# Patient Record
Sex: FEMALE | Race: BLACK OR AFRICAN AMERICAN | NOT HISPANIC OR LATINO | Employment: FULL TIME
[De-identification: names, ages, dates, MRNs, and addresses within clinical notes are randomized per-mention and may not be internally consistent; named-entity substitution may affect disease eponyms.]

---

## 2017-07-29 ENCOUNTER — HEALTH MAINTENANCE LETTER (OUTPATIENT)
Age: 47
End: 2017-07-29

## 2018-05-18 ENCOUNTER — HOSPITAL ENCOUNTER (EMERGENCY)
Facility: CLINIC | Age: 48
Discharge: HOME | End: 2018-05-18
Attending: EMERGENCY MEDICINE | Admitting: EMERGENCY MEDICINE
Payer: COMMERCIAL

## 2018-05-18 ENCOUNTER — APPOINTMENT (OUTPATIENT)
Dept: GENERAL RADIOLOGY | Facility: CLINIC | Age: 48
End: 2018-05-18
Attending: EMERGENCY MEDICINE
Payer: COMMERCIAL

## 2018-05-18 VITALS
WEIGHT: 180 LBS | DIASTOLIC BLOOD PRESSURE: 68 MMHG | SYSTOLIC BLOOD PRESSURE: 122 MMHG | OXYGEN SATURATION: 99 % | BODY MASS INDEX: 31.89 KG/M2 | RESPIRATION RATE: 20 BRPM | HEART RATE: 72 BPM | TEMPERATURE: 98.6 F

## 2018-05-18 DIAGNOSIS — M54.41 ACUTE RIGHT-SIDED LOW BACK PAIN WITH RIGHT-SIDED SCIATICA: ICD-10-CM

## 2018-05-18 PROCEDURE — 25000132 ZZH RX MED GY IP 250 OP 250 PS 637: Performed by: EMERGENCY MEDICINE

## 2018-05-18 PROCEDURE — 72100 X-RAY EXAM L-S SPINE 2/3 VWS: CPT

## 2018-05-18 PROCEDURE — 99283 EMERGENCY DEPT VISIT LOW MDM: CPT

## 2018-05-18 RX ORDER — HYDROCODONE BITARTRATE AND ACETAMINOPHEN 5; 325 MG/1; MG/1
1 TABLET ORAL ONCE
Status: DISCONTINUED | OUTPATIENT
Start: 2018-05-18 | End: 2018-05-18

## 2018-05-18 RX ORDER — TRAMADOL HYDROCHLORIDE 50 MG/1
50 TABLET ORAL ONCE
Status: COMPLETED | OUTPATIENT
Start: 2018-05-18 | End: 2018-05-18

## 2018-05-18 RX ORDER — METHYLPREDNISOLONE 4 MG
TABLET, DOSE PACK ORAL
Qty: 21 TABLET | Refills: 0 | Status: SHIPPED | OUTPATIENT
Start: 2018-05-18 | End: 2021-01-28

## 2018-05-18 RX ORDER — HYDROCODONE BITARTRATE AND ACETAMINOPHEN 5; 325 MG/1; MG/1
1 TABLET ORAL EVERY 6 HOURS PRN
Qty: 10 TABLET | Refills: 0 | Status: SHIPPED | OUTPATIENT
Start: 2018-05-18 | End: 2021-01-28

## 2018-05-18 RX ADMIN — TRAMADOL HYDROCHLORIDE 50 MG: 50 TABLET, COATED ORAL at 05:04

## 2018-05-18 ASSESSMENT — ENCOUNTER SYMPTOMS
MYALGIAS: 1
NUMBNESS: 0
BACK PAIN: 1

## 2018-05-18 NOTE — ED PROVIDER NOTES
History     Chief Complaint:  Back Pain    HPI   Hortencia Cooper is a 48 year old female who presents to the emergency department today for evaluation of back pain. The patient reports four days ago, she was bending down to  her phone out of her purse when she started to experience right sided lower back pain radiating down her right leg and intermittent tingling in her right leg specifically when ambulating. She went into urgent care two days ago and was started on Flexeril and Naproxen. She notes that these two medications have not helped with her pain. She last took her flexeril this morning at 0300. Her pain has worsened prompting her visit to the emergency department. At arrival, the patient is standing and states that the pain is much worse with sitting or laying down. She denies numbness, incontinence, and loss of bowel.     Allergies:  Oxycodone    Medications:    Flexeril  Naproxen    Past Medical History:    Pyelonephritis  Lumbago  Other acne    Past Surgical History:       D and C    Family History:    Diabetes    Social History:  The patient was accompanied to the ED by .  Smoking Status: Never  Smokeless Tobacco: Never  Alcohol Use: No  Marital Status:      Review of Systems   Genitourinary:        Negative incontinence   Musculoskeletal: Positive for back pain and myalgias.   Neurological: Negative for numbness.   All other systems reviewed and are negative.    Physical Exam     Patient Vitals for the past 24 hrs:   BP Temp Temp src Pulse Resp SpO2 Weight   18 0443 124/73 98.6  F (37  C) Temporal 88 18 100 % 81.6 kg (180 lb)         Physical Exam  General: Patient is alert and interactive when I enter the room  Head:  The scalp, face, and head appear normal  Eyes:  Conjunctivae are normal  ENT:    The nose is normal    Pinnae are normal    External acoustic canals are normal  Neck:  Trachea midline  CV:  Pulses are normal   Resp:  No respiratory distress    Abdomen:      Soft, non-tender, non-distended  Musc:  Normal muscular tone    No major joint effusions    Midline lower lumbar tenderness and right paraspinal tenderness  Skin:  No rash or lesions noted  Neuro:  Speech is normal and fluent. Face is symmetric.     Moving all extremities well. Strength intact of LE, 5/5 HF/KF/KE/DP/PF, sensation intact, gait intact   Psych: Awake. Alert.  Normal affect.  Appropriate interactions.    Emergency Department Course   Imaging:  Radiology findings were communicated with the patient and family who voiced understanding of the findings.  Lumbar spine XR, 2-3 views  IMPRESSION: No acute fractures. Alignment within normal limits.  Report per radiology     Interventions:  0504 Tramadol 50 mg PO    Emergency Department Course:  Nursing notes and vitals reviewed.  The patient was sent for a Lumbar spine XR, 2-3 views while in the emergency department, results above.   0445: I performed an exam of the patient as documented above.   0455: Patient rechecked and updated.   0628: Patient rechecked and updated.   Findings and plan explained to the Patient and spouse. Patient discharged home with instructions regarding supportive care, medications, and reasons to return. The importance of close follow-up was reviewed. The patient was prescribed Norco and Medrol Dosepak.  I personally reviewed the imaging results with the Patient and spouse and answered all related questions prior to discharge.    Impression & Plan    Medical Decision Making:  Hortencia Cooper is a 48 year old female who presents for evaluation of back pain that started after bending over to  her phone. Pain has mildly improved with interventions in the emergency department. Lumbar x-ray was obtained and was negative for acute fractures.  No red flag symptoms to suggest CT and/or MRI is indicated at this point.  There is no clinical evidence of cauda equina syndrome, discitis, spinal/epidural space hematoma  or epidural abscess or other worrisome etiology. The neurological exam is normal and the patient's symptoms seem consistent with a herniated disc. The patient will be discharged with pain medications to use as directed. Ice or heat to the back and stretching exercises. No heavy lifting, bending or twisting. Return if increasing pain, numbness, weakness, or bowel or bladder dysfunction. Of note, we discussed her oxycodone allergy which was a rash of her LE after she she had been taking them for a few weeks. I think it would be reasonable to try vicodin but we discussed allergy precautions. She was advised to schedule follow-up with her primary doctor within 3 days to re-assess symptoms.    Diagnosis:    ICD-10-CM    1. Acute right-sided low back pain with right-sided sciatica M54.41        Disposition:  discharged to home    Discharge Medications:  New Prescriptions    HYDROCODONE-ACETAMINOPHEN (NORCO) 5-325 MG PER TABLET    Take 1 tablet by mouth every 6 hours as needed for severe pain    METHYLPREDNISOLONE (MEDROL DOSEPAK) 4 MG TABLET    Follow package instructions       Scribe Disclosure:  Justo LUCERO, am serving as a scribe at 4:45 AM on 5/18/2018 to document services personally performed by Christine Marx MD based on my observations and the provider's statements to me.     5/18/2018   [facility] EMERGENCY DEPARTMENT       Christine Marx MD  05/18/18 2013

## 2018-05-18 NOTE — ED TRIAGE NOTES
48-year-old female presents to the ER with complaints of lower back pain that started on Monday after doing a simple task. Was seen at urgent care on Wednesday and started on flexeril and naproxen which hasn't helped her. Pt is now having some pain down her right leg. Standing is better than laying or sitting.

## 2018-05-18 NOTE — ED AVS SNAPSHOT
[facility] Emergency Department    [address]    [facility] [address]    Phone:  [phone]    Fax:  [phone]                                       Hortencia Cooper   MRN: [id number]    Department:  [facility] Emergency Department   Date of Visit:  5/18/2018           After Visit Summary Signature Page     I have received my discharge instructions, and my questions have been answered. I have discussed any challenges I see with this plan with the nurse or doctor.    ..........................................................................................................................................  Patient/Patient Representative Signature      ..........................................................................................................................................  Patient Representative Print Name and Relationship to Patient    ..................................................               ................................................  Date                                            Time    ..........................................................................................................................................  Reviewed by Signature/Title    ...................................................              ..............................................  Date                                                            Time

## 2018-05-18 NOTE — ED AVS SNAPSHOT
[facility] Emergency Department    [address]    [facility] [address]    Phone:  [phone]    Fax:  [phone]                                       Hortencia Cooper   MRN: [id number]    Department:  [facility] Emergency Department   Date of Visit:  5/18/2018           Patient Information     Date Of Birth          1970        Your diagnoses for this visit were:     Acute right-sided low back pain with right-sided sciatica        You were seen by Christine Marx MD.      Follow-up Information     Follow up with [facility] In 2 days.    Contact information:    [address]  [facility] [address]          Discharge Instructions       Discharge Instructions  Back Pain  You were seen today for back pain. Back pain can have many causes, but most will get better without surgery or other specific treatment. Sometimes there is a herniated ( slipped ) disc. We do not usually do MRI scans to look for these right away, since most herniated discs will get better on their own with time.  Today, we did not find any evidence that your back pain was caused by a serious condition. However, sometimes symptoms develop over time and cannot be found during an emergency visit, so it is very important that you follow up with your primary provider.  Generally, every Emergency Department visit should have a follow-up clinic visit with either a primary or a specialty clinic/provider. Please follow-up as instructed by your emergency provider today.    Return to the Emergency Department if:    You develop a fever with your back pain.     You have weakness or change in sensation in one or both legs.    You lose control of your bowels or bladder, or cannot empty your bladder (cannot pee).    Your pain gets much worse.     Follow-up with your provider:    Unless your pain has completely gone away, please make an appointment with your provider within one week. Most  of the routine care for back pain is available in a clinic and not the Emergency Department. You may need further management of your back pain, such as more pain medication, imaging such as an X-ray or MRI, or physical therapy.    What can I do to help myself?    Remain Active -- People are often afraid that they will hurt their back further or delay recovery by remaining active, but this is one of the best things you can do for your back. In fact, staying in bed for a long time to rest is not recommended. Studies have shown that people with low back pain recover faster when they remain active. Movement helps to bring blood flow to the muscles and relieve muscle spasms as well as preventing loss of muscle strength.    Heat -- Using a heating pad can help with low back pain during the first few weeks. Do not sleep with a heating pad, as you can be burned.     Pain medications - You may take a pain medication such as Tylenol  (acetaminophen), Advil , Motrin  (ibuprofen) or Aleve  (naproxen).  If you were given a prescription for medicine here today, be sure to read all of the information (including the package insert) that comes with your prescription.  This will include important information about the medicine, its side effects, and any warnings that you need to know about.  The pharmacist who fills the prescription can provide more information and answer questions you may have about the medicine.  If you have questions or concerns that the pharmacist cannot address, please call or return to the Emergency Department.   Remember that you can always come back to the Emergency Department if you are not able to see your regular provider in the amount of time listed above, if you get any new symptoms, or if there is anything that worries you.      24 Hour Appointment Hotline       To make an appointment at any [facility], call [phone] ([phone]). If you don't have a family doctor or clinic, we will help  you find one. [facility] are conveniently located to serve the needs of you and your family.             Review of your medicines      START taking        Dose / Directions Last dose taken    HYDROcodone-acetaminophen 5-325 MG per tablet   Commonly known as:  NORCO   Dose:  1 tablet   Quantity:  10 tablet        Take 1 tablet by mouth every 6 hours as needed for severe pain   Refills:  0        methylPREDNISolone 4 MG tablet   Commonly known as:  MEDROL DOSEPAK   Quantity:  21 tablet        Follow package instructions   Refills:  0          Our records show that you are taking the medicines listed below. If these are incorrect, please call your family doctor or clinic.        Dose / Directions Last dose taken    NO ACTIVE MEDICATIONS        Refills:  0                Information about OPIOIDS     PRESCRIPTION OPIOIDS: WHAT YOU NEED TO KNOW   You have a prescription for an opioid (narcotic) pain medicine. Opioids can cause addiction. If you have a history of chemical dependency of any type, you are at a higher risk of becoming addicted to opioids. Only take this medicine after all other options have been tried. Take it for as short a time and as few doses as possible.     Do not:    Drive. If you drive while taking these medicines, you could be arrested for driving under the influence (DUI).    Operate heavy machinery    Do any other dangerous activities while taking these medicines.     Drink any alcohol while taking these medicines.      Take with any other medicines that contain acetaminophen. Read all labels carefully. Look for the word  acetaminophen  or  Tylenol.  Ask your pharmacist if you have questions or are unsure.    Store your pills in a secure place, locked if possible. We will not replace any lost or stolen medicine. If you don t finish your medicine, please throw away (dispose) as directed by your pharmacist. The [place] Pollution Control Agency has more information about safe disposal:  https://www.pca.[facility].[place]/living-green/managing-unwanted-medications    All opioids tend to cause constipation. Drink plenty of water and eat foods that have a lot of fiber, such as fruits, vegetables, prune juice, apple juice and high-fiber cereal. Take a laxative (Miralax, milk of magnesia, Colace, Senna) if you don t move your bowels at least every other day.         Prescriptions were sent or printed at these locations (2 Prescriptions)                   Other Prescriptions                Printed at Department/Unit printer (2 of 2)         HYDROcodone-acetaminophen (NORCO) 5-325 MG per tablet               methylPREDNISolone (MEDROL DOSEPAK) 4 MG tablet                Procedures and tests performed during your visit     Lumbar spine XR, 2-3 views      Orders Needing Specimen Collection     None      Pending Results     No orders found from 5/16/2018 to 5/19/2018.            Pending Culture Results     No orders found from 5/16/2018 to 5/19/2018.            Pending Results Instructions     If you had any lab results that were not finalized at the time of your Discharge, you can call the ED Lab Result RN at [phone]. You will be contacted by this team for any positive Lab results or changes in treatment. The nurses are available 7 days a week from 10A to 6:30P.  You can leave a message 24 hours per day and they will return your call.        Test Results From Your Hospital Stay        5/18/2018  6:26 AM      Narrative     XR LUMBAR SPINE 2-3 VIEWS   5/18/2018 6:21 AM     INDICATION: Pain.    COMPARISON: None.        Impression     IMPRESSION: No acute fractures. Alignment within normal limits.    FAHAD PABLO MD                Clinical Quality Measure: Blood Pressure Screening     Your blood pressure was checked while you were in the emergency department today. The last reading we obtained was  BP: 124/73 . Please read the guidelines below about what these numbers mean and what you should do about  "them.  If your systolic blood pressure (the top number) is less than 120 and your diastolic blood pressure (the bottom number) is less than 80, then your blood pressure is normal. There is nothing more that you need to do about it.  If your systolic blood pressure (the top number) is 120-139 or your diastolic blood pressure (the bottom number) is 80-89, your blood pressure may be higher than it should be. You should have your blood pressure rechecked within a year by a primary care provider.  If your systolic blood pressure (the top number) is 140 or greater or your diastolic blood pressure (the bottom number) is 90 or greater, you may have high blood pressure. High blood pressure is treatable, but if left untreated over time it can put you at risk for heart attack, stroke, or kidney failure. You should have your blood pressure rechecked by a primary care provider within the next 4 weeks.  If your provider in the emergency department today gave you specific instructions to follow-up with your doctor or provider even sooner than that, you should follow that instruction and not wait for up to 4 weeks for your follow-up visit.        Thank you for choosing [facility]       Thank you for choosing [facility] for your care. Our goal is always to provide you with excellent care. Hearing back from our patients is one way we can continue to improve our services. Please take a few minutes to complete the written survey that you may receive in the mail after you visit with us. Thank you!        [employer] Information     [employer] lets you send messages to your doctor, view your test results, renew your prescriptions, schedule appointments and more. To sign up, go to [email or web address] . Click on \"Log in\" on the left side of the screen, which will take you to the Welcome page. Then click on \"Sign up Now\" on the right side of the page.     You will be asked to enter the access code listed below, as well as some personal " information. Please follow the directions to create your username and password.     Your access code is: [email or web address]  Expires: 2018  6:37 AM     Your access code will  in 90 days. If you need help or a new code, please call your [facility] clinic or [phone].        Care EveryWhere ID     This is your Care EveryWhere ID. This could be used by other organizations to access your [facility] medical records  [id number]        Equal Access to Services     [facility] CenterRICARDO : Hadcan jimenezo Socarlos, waaxda luqadaha, qaybta kaalmada ademert, lauren woodard . So [facility] [phone].    ATENCIÓN: Si habla español, tiene a willard disposición servicios gratuitos de asistencia lingüística. Llame al [phone].    We comply with applicable federal civil rights laws and [place] laws. We do not discriminate on the basis of race, color, national origin, age, disability, sex, sexual orientation, or gender identity.            After Visit Summary       This is your record. Keep this with you and show to your community pharmacist(s) and doctor(s) at your next visit.

## 2018-05-18 NOTE — LETTER
May 18, 2018      To Whom It May Concern:      Hortencia Cooper was seen in our Emergency Department today, 05/18/18. Pt may return to work on Monday.    Sincerely,        Marcela Michel RN

## 2019-05-21 ENCOUNTER — OFFICE VISIT (OUTPATIENT)
Dept: PEDIATRICS | Facility: CLINIC | Age: 49
End: 2019-05-21
Payer: COMMERCIAL

## 2019-05-21 VITALS
WEIGHT: 175.7 LBS | RESPIRATION RATE: 16 BRPM | HEART RATE: 88 BPM | TEMPERATURE: 98.8 F | BODY MASS INDEX: 31.12 KG/M2 | OXYGEN SATURATION: 100 % | SYSTOLIC BLOOD PRESSURE: 98 MMHG | DIASTOLIC BLOOD PRESSURE: 54 MMHG

## 2019-05-21 DIAGNOSIS — N30.90 CYSTITIS: Primary | ICD-10-CM

## 2019-05-21 LAB
ALBUMIN UR-MCNC: 30 MG/DL
APPEARANCE UR: ABNORMAL
BACTERIA #/AREA URNS HPF: ABNORMAL /HPF
BILIRUB UR QL STRIP: NEGATIVE
COLOR UR AUTO: YELLOW
GLUCOSE UR STRIP-MCNC: NEGATIVE MG/DL
HGB UR QL STRIP: ABNORMAL
KETONES UR STRIP-MCNC: NEGATIVE MG/DL
LEUKOCYTE ESTERASE UR QL STRIP: ABNORMAL
NITRATE UR QL: NEGATIVE
NON-SQ EPI CELLS #/AREA URNS LPF: ABNORMAL /LPF
PH UR STRIP: 5.5 PH (ref 5–7)
RBC #/AREA URNS AUTO: ABNORMAL /HPF
SOURCE: ABNORMAL
SP GR UR STRIP: 1.01 (ref 1–1.03)
UROBILINOGEN UR STRIP-ACNC: 0.2 EU/DL (ref 0.2–1)
WBC #/AREA URNS AUTO: ABNORMAL /HPF

## 2019-05-21 PROCEDURE — 81001 URINALYSIS AUTO W/SCOPE: CPT | Performed by: PHYSICIAN ASSISTANT

## 2019-05-21 PROCEDURE — 99203 OFFICE O/P NEW LOW 30 MIN: CPT | Performed by: PHYSICIAN ASSISTANT

## 2019-05-21 PROCEDURE — 87086 URINE CULTURE/COLONY COUNT: CPT | Performed by: PHYSICIAN ASSISTANT

## 2019-05-21 RX ORDER — SULFAMETHOXAZOLE/TRIMETHOPRIM 800-160 MG
1 TABLET ORAL 2 TIMES DAILY
Qty: 6 TABLET | Refills: 0 | Status: SHIPPED | OUTPATIENT
Start: 2019-05-21 | End: 2021-01-28

## 2019-05-21 NOTE — PROGRESS NOTES
UTI   This is a new problem. The current episode started yesterday. The problem occurs constantly. The problem has been unchanged. Associated symptoms include urinary symptoms. Associated symptoms comments: Dysuria especially at end, abdominal pressure, hematuria in end of stream. The symptoms are aggravated by walking. She has tried nothing for the symptoms.         ROS      Physical Exam

## 2019-05-21 NOTE — LETTER
[facility]  [address] [address]                  [phone]   June 6, 2019    Hortencia Cooper  [address] [address]      Dear Hortencia,    Here is a summary of your recent test results:    Your urine culture is NEGATIVE. I would like you to continue antibiotics. You will also need a repeat UA( Urine Test) in two weeks given hematuria. Please call to schedule at [phone].    Your test results are enclosed.      Please contact me if you have any questions.           Thank you very much for choosing [facility]    Best regards,    CLAIRE Concepcion        Results for orders placed or performed in visit on 05/21/19   UA reflex to Microscopic and Culture   Result Value Ref Range    Color Urine Yellow     Appearance Urine Slightly Cloudy     Glucose Urine Negative NEG^Negative mg/dL    Bilirubin Urine Negative NEG^Negative    Ketones Urine Negative NEG^Negative mg/dL    Specific Gravity Urine 1.015 1.003 - 1.035    Blood Urine Large (A) NEG^Negative    pH Urine 5.5 5.0 - 7.0 pH    Protein Albumin Urine 30 (A) NEG^Negative mg/dL    Urobilinogen Urine 0.2 0.2 - 1.0 EU/dL    Nitrite Urine Negative NEG^Negative    Leukocyte Esterase Urine Small (A) NEG^Negative    Source Midstream Urine    Urine Microscopic   Result Value Ref Range    WBC Urine 25-50 (A) OTO5^0 - 5 /HPF    RBC Urine  (A) OTO2^O - 2 /HPF    Squamous Epithelial /LPF Urine Few FEW^Few /LPF    Bacteria Urine Few (A) NEG^Negative /HPF   Urine Culture Aerobic Bacterial   Result Value Ref Range    Specimen Description Midstream Urine     Culture Micro No growth

## 2019-05-21 NOTE — PROGRESS NOTES
Subjective     Hortencia Cooper is a 49 year old female who presents to clinic today for the following health issues:    HPI   URINARY TRACT SYMPTOMS  Onset: one day    Description:   Painful urination (Dysuria): YES  Blood in urine (Hematuria): no   Delay in urine (Hesitency): no     Intensity: mild    Progression of Symptoms:  worsening    Accompanying Signs & Symptoms:  Fever/chills: no   Flank pain no   Nausea and vomiting: no   Any vaginal symptoms: none  Abdominal/Pelvic Pain: no     History:   History of frequent UTI's: no   History of kidney stones: no   Sexually Active: yes  Possibility of pregnancy: No    Precipitating factors:   none  Therapies Tried and outcome: none    ROS otherwise NEGATIVE         Objective    BP 98/54 (BP Location: Right arm, Patient Position: Chair, Cuff Size: Adult Regular)   Pulse 88   Temp 98.8  F (37.1  C) (Oral)   Resp 16   Wt 79.7 kg (175 lb 11.2 oz)   SpO2 100%   Breastfeeding? No   BMI 31.12 kg/m    Body mass index is 31.12 kg/m .  Physical Exam   GENERAL: healthy, alert and no distress  RESP: lungs clear to auscultation - no rales, rhonchi or wheezes  CV: regular rate and rhythm, normal S1 S2, no S3 or S4, no murmur  ABDOMEN: soft, nontender  BACK: no CVA tenderness    Diagnostic Test Results:  Results for orders placed or performed in visit on 05/21/19 (from the past 24 hour(s))   UA reflex to Microscopic and Culture   Result Value Ref Range    Color Urine Yellow     Appearance Urine Slightly Cloudy     Glucose Urine Negative NEG^Negative mg/dL    Bilirubin Urine Negative NEG^Negative    Ketones Urine Negative NEG^Negative mg/dL    Specific Gravity Urine 1.015 1.003 - 1.035    Blood Urine Large (A) NEG^Negative    pH Urine 5.5 5.0 - 7.0 pH    Protein Albumin Urine 30 (A) NEG^Negative mg/dL    Urobilinogen Urine 0.2 0.2 - 1.0 EU/dL    Nitrite Urine Negative NEG^Negative    Leukocyte Esterase Urine Small (A) NEG^Negative    Source Midstream Urine    Urine  Microscopic   Result Value Ref Range    WBC Urine 25-50 (A) OTO5^0 - 5 /HPF    RBC Urine  (A) OTO2^O - 2 /HPF    Squamous Epithelial /LPF Urine Few FEW^Few /LPF    Bacteria Urine Few (A) NEG^Negative /HPF           Assessment & Plan   (N30.90) Cystitis  (primary encounter diagnosis)  Comment: begin antibiotics. UC pending.   Plan: UA reflex to Microscopic and Culture, Urine         Microscopic, Urine Culture Aerobic Bacterial,         sulfamethoxazole-trimethoprim (BACTRIM DS)         800-160 MG tablet          Soren Perez PA-C  [facility]

## 2019-05-21 NOTE — PROGRESS NOTES
"SUBJECTIVE:                                                    Hortencia Cooper is a 49 year old female who presents to clinic today with {Side:5061} because of:    Chief Complaint   Patient presents with     UTI      {Provider please address medication reconciliation discrepancies--rooming staff please delete if no med/rec issues}  HPI:  {Peds Problem Superlist:898813}    {Additional problems for provider to add:033091}    ROS:  {ROS Choices:611572}    PROBLEM LIST:  Patient Active Problem List    Diagnosis Date Noted     CARDIOVASCULAR SCREENING; LDL GOAL LESS THAN 160 10/31/2010     Priority: Medium     Other acne 02/23/2005     Priority: Medium      MEDICATIONS:  Current Outpatient Medications   Medication Sig Dispense Refill     HYDROcodone-acetaminophen (NORCO) 5-325 MG per tablet Take 1 tablet by mouth every 6 hours as needed for severe pain 10 tablet 0     methylPREDNISolone (MEDROL DOSEPAK) 4 MG tablet Follow package instructions 21 tablet 0     NO ACTIVE MEDICATIONS         ALLERGIES:  Allergies   Allergen Reactions     Oxycodone        Problem list and histories reviewed & adjusted, as indicated.    OBJECTIVE:                                                    {Note vitals & weights}  BP 98/54 (BP Location: Right arm, Patient Position: Chair, Cuff Size: Adult Regular)   Pulse 88   Temp 98.8  F (37.1  C) (Oral)   Resp 16   Wt 79.7 kg (175 lb 11.2 oz)   SpO2 100%   Breastfeeding? No   BMI 31.12 kg/m     Blood pressure percentiles are not available for patients who are 18 years or older.    {Exam choices:201033}    DIAGNOSTICS: {Diagnostics:102950::\"None\"}    ASSESSMENT/PLAN:                                                    {Diagnosis Options:020564}    FOLLOW UP: { :606562}    Soren Perez PA-C    "

## 2019-05-22 ENCOUNTER — TELEPHONE (OUTPATIENT)
Dept: PEDIATRICS | Facility: CLINIC | Age: 49
End: 2019-05-22

## 2019-05-22 DIAGNOSIS — R31.9 HEMATURIA, UNSPECIFIED TYPE: Primary | ICD-10-CM

## 2019-05-22 LAB
BACTERIA SPEC CULT: NO GROWTH
SPECIMEN SOURCE: NORMAL

## 2019-06-11 DIAGNOSIS — R31.9 HEMATURIA, UNSPECIFIED TYPE: ICD-10-CM

## 2019-06-11 LAB
ALBUMIN UR-MCNC: NEGATIVE MG/DL
APPEARANCE UR: CLEAR
BILIRUB UR QL STRIP: NEGATIVE
COLOR UR AUTO: YELLOW
GLUCOSE UR STRIP-MCNC: NEGATIVE MG/DL
HGB UR QL STRIP: NEGATIVE
KETONES UR STRIP-MCNC: 15 MG/DL
LEUKOCYTE ESTERASE UR QL STRIP: NEGATIVE
NITRATE UR QL: NEGATIVE
PH UR STRIP: 5.5 PH (ref 5–7)
SOURCE: ABNORMAL
SP GR UR STRIP: 1.02 (ref 1–1.03)
UROBILINOGEN UR STRIP-ACNC: 0.2 EU/DL (ref 0.2–1)

## 2019-06-11 PROCEDURE — 81003 URINALYSIS AUTO W/O SCOPE: CPT | Performed by: PHYSICIAN ASSISTANT

## 2021-01-28 ENCOUNTER — OFFICE VISIT (OUTPATIENT)
Dept: FAMILY MEDICINE | Facility: CLINIC | Age: 51
End: 2021-01-28

## 2021-01-28 VITALS
OXYGEN SATURATION: 97 % | HEIGHT: 63 IN | BODY MASS INDEX: 30.48 KG/M2 | HEART RATE: 58 BPM | TEMPERATURE: 98 F | SYSTOLIC BLOOD PRESSURE: 110 MMHG | WEIGHT: 172 LBS | DIASTOLIC BLOOD PRESSURE: 70 MMHG

## 2021-01-28 DIAGNOSIS — Z12.31 ENCOUNTER FOR SCREENING MAMMOGRAM FOR BREAST CANCER: ICD-10-CM

## 2021-01-28 DIAGNOSIS — M54.2 NECK PAIN ON LEFT SIDE: Primary | ICD-10-CM

## 2021-01-28 DIAGNOSIS — E66.811 CLASS 1 OBESITY DUE TO EXCESS CALORIES WITHOUT SERIOUS COMORBIDITY WITH BODY MASS INDEX (BMI) OF 30.0 TO 30.9 IN ADULT: ICD-10-CM

## 2021-01-28 DIAGNOSIS — Z71.89 ACP (ADVANCE CARE PLANNING): ICD-10-CM

## 2021-01-28 DIAGNOSIS — Z12.11 SPECIAL SCREENING FOR MALIGNANT NEOPLASMS, COLON: ICD-10-CM

## 2021-01-28 DIAGNOSIS — Z76.89 HEALTH CARE HOME: ICD-10-CM

## 2021-01-28 DIAGNOSIS — E66.09 CLASS 1 OBESITY DUE TO EXCESS CALORIES WITHOUT SERIOUS COMORBIDITY WITH BODY MASS INDEX (BMI) OF 30.0 TO 30.9 IN ADULT: ICD-10-CM

## 2021-01-28 DIAGNOSIS — M54.12 CERVICAL RADICULOPATHY: ICD-10-CM

## 2021-01-28 PROCEDURE — 99203 OFFICE O/P NEW LOW 30 MIN: CPT | Performed by: PHYSICIAN ASSISTANT

## 2021-01-28 RX ORDER — METHYLPREDNISOLONE 4 MG
TABLET, DOSE PACK ORAL
Qty: 21 TABLET | Refills: 0 | Status: SHIPPED | OUTPATIENT
Start: 2021-01-28 | End: 2021-04-07

## 2021-01-28 RX ORDER — B-COMPLEX WITH VITAMIN C
TABLET ORAL
COMMUNITY

## 2021-01-28 ASSESSMENT — MIFFLIN-ST. JEOR: SCORE: 1364.32

## 2021-01-28 NOTE — LETTER
[facility]  [address]  [address]  [facility] [address]  [phone]      January 28, 2021      Hortencia SPRAGUE [facility]  [address] [facility] [address]      EMERGENCY CARE PLAN  Presenting Problem Treatment Plan   Questions or concerns during clinic hours I will call the clinic directly:    [facility]  [address] , [address]  [place] [address] [phone]   Questions or concerns outside clinic hours  I will call the 24 hour line at [phone]   Patient needs to schedule an appointment  I will call the  scheduling line at [phone]   Same day treatment   I will call the clinic first, then  urgent care and/or  express care if needed   Clinic Care Coordinators Cassidy Pollack RN:  [phone]  [facility] Clinical Support Staff: [phone]    Crisis Services:  Behavioral or Mental Health P (Behavioral Health Providers)   [phone]   Emergency treatment--Immediately CALL [phone]

## 2021-01-28 NOTE — NURSING NOTE
Hortencia is here to establish care and for left sided neck pain that radiates down left arm, has been going on for months but much worse the past week, trouble sleeping.        Pre-visit Screening:  Immunizations:  up to date  Colonoscopy:  is due and ordered today  Mammogram: is due and ordered today  Asthma Action Test/Plan:  NA  PHQ9:  NA  GAD7:  NA  Questioned patient about current smoking habits Pt. has never smoked.  Ok to leave detailed message on voice mail for today's visit only Yes, phone # [phone]      
resilient/elastic

## 2021-01-28 NOTE — PROGRESS NOTES
"CC: Neck pain, arm pain    History:  Hortencia is a new patient here today with concerns over neck and shoulder pain. She plans to return in near future for a fasting physical    Neck pain/shoulder pain:  10 days ago, on 1/27/2021, developed significant left sided neck pain.  At the onset of symptoms, Hortencia was reaching up above her head to grab containers at [employer] office. Since onset, it has progressed, where in the past 2-3 days has been having significant pain traveling down left arm and into hand/fingers. Does have a history of similar symptoms, where she had x-ray that showed DDD. Had injection that worked well for 2 years. Has been taking naproxen. Has not been trying any heating.     Nail changes:  Plans to start zinc supplement.     PMH, MEDICATIONS, ALLERGIES, SOCIAL AND FAMILY HISTORY in [facility] and reviewed by me personally.    ROS negative other than the symptoms noted above in the HPI.    Examination   /70 (BP Location: Left arm, Patient Position: Sitting, Cuff Size: Adult Large)   Pulse 58   Temp 98  F (36.7  C) (Oral)   Ht 1.6 m (5' 3\")   Wt 78 kg (172 lb)   LMP 08/25/2020   SpO2 97%   BMI 30.47 kg/m       Constitutional: Sitting comfortably, in no acute distress. Vital signs noted  Neck:  no adenopathy, trachea midline and normal to palpation, thyroid normal to palpation  Cardiovascular:  regular rate and rhythm, no murmurs, clicks, or gallops  Respiratory:  normal respiratory rate and rhythm, lungs clear to auscultation  M/S: ROM of shoulder intact. Pain on anterior shoulder and upper back with left shoulder extension, abduction. No edema.   NEURO:  muscle strength normal, sensation to light touch and pinprick normal, reflexes normal and symmetric  SKIN: No jaundice/pallor/rash.   Psychiatric: mentation appears normal and affect normal/bright      A/P    ICD-10-CM    1. Neck pain on left side  M54.2    2. Cervical radiculopathy  M54.12 methylPREDNISolone (MEDROL DOSEPAK) 4 MG tablet therapy " pack     Other Specialty Referral   3. Class 1 obesity due to excess calories without serious comorbidity with body mass index (BMI) of 30.0 to 30.9 in adult  E66.09     Z68.30    4. Special screening for malignant neoplasms, colon  Z12.11 GASTROENTEROLOGY ADULT REF PROCEDURE ONLY   5. Encounter for screening mammogram for breast cancer  Z12.31 Mammo Screening digital (bilat)   6. Health Care Home  Z76.89    7. ACP (advance care planning)  Z71.89        DISCUSSION:  Cervicalgia:  Neck/left arm pain today consistent C6 nerve pain. Suspect due to history of degenerative disc disease. Recommended trial Medrol dose pack (steroid). Take daily with food. Warned of side effects like drowsiness, jitteriness, nausea, diarrhea, constipation, weight changes, irritability, mood swings, and to contact me if noted. Try to apply heat twice daily 15-20 minutes.    I will submit referral to ortho, and pt will call to schedule.     follow up visit: As needed    Michelle Hanson PA-C  [facility]

## 2021-01-28 NOTE — PATIENT INSTRUCTIONS
Neck/left arm pain today consistent C6 nerve pain. Suspect due to history of degenerative disc disc disease. Recommended trial Medrol dose pack (steroid). Take daily with food. Warned of side effects like drowsiness, jitteriness, nausea, diarrhea, constipation, weight changes, irritability, mood swings, and to contact me if noted. Try to apply heat twice daily 15-20 minutes.    I will submit referral to [facility] for you to call and schedule [phone].

## 2021-04-07 ENCOUNTER — OFFICE VISIT (OUTPATIENT)
Dept: FAMILY MEDICINE | Facility: CLINIC | Age: 51
End: 2021-04-07

## 2021-04-07 VITALS
OXYGEN SATURATION: 99 % | DIASTOLIC BLOOD PRESSURE: 78 MMHG | TEMPERATURE: 98.1 F | WEIGHT: 172 LBS | HEART RATE: 72 BPM | SYSTOLIC BLOOD PRESSURE: 122 MMHG | HEIGHT: 63 IN | BODY MASS INDEX: 30.48 KG/M2

## 2021-04-07 DIAGNOSIS — F43.21 ADJUSTMENT DISORDER WITH DEPRESSED MOOD: ICD-10-CM

## 2021-04-07 DIAGNOSIS — F41.1 GENERALIZED ANXIETY DISORDER: Primary | ICD-10-CM

## 2021-04-07 PROCEDURE — 99213 OFFICE O/P EST LOW 20 MIN: CPT | Performed by: PHYSICIAN ASSISTANT

## 2021-04-07 RX ORDER — ESCITALOPRAM OXALATE 5 MG/1
5 TABLET ORAL DAILY
Qty: 30 TABLET | Refills: 1 | Status: SHIPPED | OUTPATIENT
Start: 2021-04-07 | End: 2022-08-11

## 2021-04-07 ASSESSMENT — ANXIETY QUESTIONNAIRES
7. FEELING AFRAID AS IF SOMETHING AWFUL MIGHT HAPPEN: NEARLY EVERY DAY
5. BEING SO RESTLESS THAT IT IS HARD TO SIT STILL: SEVERAL DAYS
6. BECOMING EASILY ANNOYED OR IRRITABLE: SEVERAL DAYS
1. FEELING NERVOUS, ANXIOUS, OR ON EDGE: NEARLY EVERY DAY
GAD7 TOTAL SCORE: 16
IF YOU CHECKED OFF ANY PROBLEMS ON THIS QUESTIONNAIRE, HOW DIFFICULT HAVE THESE PROBLEMS MADE IT FOR YOU TO DO YOUR WORK, TAKE CARE OF THINGS AT HOME, OR GET ALONG WITH OTHER PEOPLE: EXTREMELY DIFFICULT
3. WORRYING TOO MUCH ABOUT DIFFERENT THINGS: NEARLY EVERY DAY
2. NOT BEING ABLE TO STOP OR CONTROL WORRYING: MORE THAN HALF THE DAYS

## 2021-04-07 ASSESSMENT — PATIENT HEALTH QUESTIONNAIRE - PHQ9
SUM OF ALL RESPONSES TO PHQ QUESTIONS 1-9: 16
5. POOR APPETITE OR OVEREATING: NEARLY EVERY DAY

## 2021-04-07 ASSESSMENT — MIFFLIN-ST. JEOR: SCORE: 1359.32

## 2021-04-07 NOTE — NURSING NOTE
Hortencia is here for a consult on recent stress.      Pre-visit Screening:  Immunizations:  up to date  Colonoscopy:  is due and to be scheduled by patient for later completion  Mammogram: is due and to be scheduled by patient for later completion  Asthma Action Test/Plan:  NA  PHQ9:  Done today  GAD7:  Done today  Questioned patient about current smoking habits Pt. has never smoked.  Ok to leave detailed message on voice mail for today's visit only Yes, phone # [phone]

## 2021-04-07 NOTE — PATIENT INSTRUCTIONS
Consistent with generalized anxiety, worrying with recent stressors with work.    Discussed concept of SSRI medication like escitalopram where serotonin levels are increased.     Agreed to start trial of esitalopram 5 mg daily. Take in the morning or night, take with food. Informed her that it can take 4 weeks to take full effect in her symptoms. Monitor for side effects, including sleep changes, worsening depression, weight changes, GI issues, sexual changes, and contact me if noted. Otherwise, filled for 2 months and should schedule an appt at that time to discuss medication, symptoms. Avoid alcohol while taking.

## 2021-04-07 NOTE — PROGRESS NOTES
"CC: Increased stress    History:  Hortencia has been working overnights for 18 years. For the past 8 months since August she is noticing she is more stressed in general, also feels some lower mood, hopelessness, but no SI/HI. About 5 weeks ago got 2 new bosses at work at the [employer] in \"the plant\". Hortencia feels like these bosses are rude/mean to her. Will yell at her. For example, this morning, she had stayed 14-15 hours which was longer than her planned shift and was yelled at she should be gone. This made her emotional and she had to tell them she could not come back later tonight for the shift she picked up..     Then went home and had started menstrual period, which she had not had since 8/2021. Has never had her cycle stop for more than 1 year, but has been irregular for the past several years.     PMH, MEDICATIONS, ALLERGIES, SOCIAL AND FAMILY HISTORY in [facility] and reviewed by me personally.    ROS negative other than the symptoms noted above in the HPI.      Examination   /78 (BP Location: Right arm, Patient Position: Sitting, Cuff Size: Adult Large)   Pulse 72   Temp 98.1  F (36.7  C) (Temporal)   Ht 1.6 m (5' 3\")   Wt 78 kg (172 lb)   SpO2 99%   BMI 30.47 kg/m       Constitutional: Sitting comfortably, in no acute distress. Vital signs noted  Psychiatric: mentation appears normal and affect normal/bright      A/P    ICD-10-CM    1. Generalized anxiety disorder  F41.1 escitalopram (LEXAPRO) 5 MG tablet   2. Adjustment disorder with depressed mood  F43.21        DISCUSSION:  UTNDE/PHQ today consistent with anxiety, depressed mood. Situational worsening with recent stressors with work.    Discussed concept of SSRI medication like escitalopram where serotonin levels are increased.     Agreed to start trial of esitalopram 5 mg daily. Take in the morning or night, take with food. Informed her that it can take 4 weeks to take full effect in her symptoms. Monitor for side effects, including sleep changes, " worsening depression, weight changes, GI issues, sexual changes, and contact me if noted. Otherwise, filled for 2 months and should schedule an appt at that time to discuss medication, symptoms. Avoid alcohol while taking.      follow up visit: 2 months    [facility]

## 2021-04-08 ASSESSMENT — ANXIETY QUESTIONNAIRES: GAD7 TOTAL SCORE: 16

## 2021-04-21 ENCOUNTER — TRANSFERRED RECORDS (OUTPATIENT)
Dept: FAMILY MEDICINE | Facility: CLINIC | Age: 51
End: 2021-04-21

## 2021-08-19 ENCOUNTER — OFFICE VISIT (OUTPATIENT)
Dept: FAMILY MEDICINE | Facility: CLINIC | Age: 51
End: 2021-08-19

## 2021-08-19 VITALS
HEART RATE: 65 BPM | TEMPERATURE: 97.6 F | BODY MASS INDEX: 29.77 KG/M2 | RESPIRATION RATE: 20 BRPM | HEIGHT: 63 IN | WEIGHT: 168 LBS | OXYGEN SATURATION: 96 % | SYSTOLIC BLOOD PRESSURE: 106 MMHG | DIASTOLIC BLOOD PRESSURE: 66 MMHG

## 2021-08-19 DIAGNOSIS — Z12.31 ENCOUNTER FOR SCREENING MAMMOGRAM FOR BREAST CANCER: ICD-10-CM

## 2021-08-19 DIAGNOSIS — Z12.11 SPECIAL SCREENING FOR MALIGNANT NEOPLASMS, COLON: ICD-10-CM

## 2021-08-19 DIAGNOSIS — F43.21 ADJUSTMENT DISORDER WITH DEPRESSED MOOD: ICD-10-CM

## 2021-08-19 DIAGNOSIS — F41.1 GENERALIZED ANXIETY DISORDER: Primary | ICD-10-CM

## 2021-08-19 PROCEDURE — 99213 OFFICE O/P EST LOW 20 MIN: CPT | Performed by: PHYSICIAN ASSISTANT

## 2021-08-19 RX ORDER — ESCITALOPRAM OXALATE 10 MG/1
10 TABLET ORAL DAILY
Qty: 90 TABLET | Refills: 0 | Status: SHIPPED | OUTPATIENT
Start: 2021-08-19 | End: 2022-08-11

## 2021-08-19 ASSESSMENT — ANXIETY QUESTIONNAIRES
3. WORRYING TOO MUCH ABOUT DIFFERENT THINGS: MORE THAN HALF THE DAYS
7. FEELING AFRAID AS IF SOMETHING AWFUL MIGHT HAPPEN: MORE THAN HALF THE DAYS
6. BECOMING EASILY ANNOYED OR IRRITABLE: SEVERAL DAYS
GAD7 TOTAL SCORE: 9
1. FEELING NERVOUS, ANXIOUS, OR ON EDGE: MORE THAN HALF THE DAYS
2. NOT BEING ABLE TO STOP OR CONTROL WORRYING: SEVERAL DAYS
IF YOU CHECKED OFF ANY PROBLEMS ON THIS QUESTIONNAIRE, HOW DIFFICULT HAVE THESE PROBLEMS MADE IT FOR YOU TO DO YOUR WORK, TAKE CARE OF THINGS AT HOME, OR GET ALONG WITH OTHER PEOPLE: SOMEWHAT DIFFICULT
5. BEING SO RESTLESS THAT IT IS HARD TO SIT STILL: SEVERAL DAYS

## 2021-08-19 ASSESSMENT — PATIENT HEALTH QUESTIONNAIRE - PHQ9
5. POOR APPETITE OR OVEREATING: NOT AT ALL
SUM OF ALL RESPONSES TO PHQ QUESTIONS 1-9: 7

## 2021-08-19 ASSESSMENT — MIFFLIN-ST. JEOR: SCORE: 1341.17

## 2021-08-19 NOTE — PATIENT INSTRUCTIONS
Symptoms of anxiety and depression are improved, especially when taking escitalopram medication. However agreed to restart on escitalopram 10 mg tablet, but start by taking 1/2 tablet daily for 2 weeks, which was the same as previous dose. If doing well, no side effects after 2 weeks, recommended increase up to 1 full tablet daily, which is a higher dose than before. This will ideally offer additional help with more anxiety (stress, worry, irritability) and depression (low mood, crying, lack of motivation). Fortunately, I feel that Hortencia is safe. Continue using coping techniques of reading bible, listening to music. Refilled 90 day supply, so please return to clinic in November to discuss or contact me sooner with concerns.

## 2021-08-19 NOTE — PROGRESS NOTES
"CC: Anxiety    History:  Anxiety:  Hortencia was here 4/7/2021 to discuss increasing stress, lower mood, tearfulness. 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 Agreed to start on escitalopram 5 mg. She did take this, and it seemed to help, and did not have any side effects. However, ran out of medication 2 weeks ago, and symptoms have been stable. Did get new boss at worse, which has been helpful.      Chest pain:  Hortencia has been having intermittent chest pain in lower left chest. Can happen at rest or moving around kitchen. Also noticing instances of racing heart on occasion. Denies any chest burning, belching, bad taste in mouth. Goes away within a few minutes.     PMH, MEDICATIONS, ALLERGIES, SOCIAL AND FAMILY HISTORY in [facility] and reviewed by me personally.    ROS negative other than the symptoms noted above in the HPI.     Examination   /66 (BP Location: Left arm, Patient Position: Sitting, Cuff Size: Adult Large)   Pulse 65   Temp 97.6  F (36.4  C) (Temporal)   Resp 20   Ht 1.6 m (5' 3\")   Wt 76.2 kg (168 lb)   SpO2 96%   BMI 29.76 kg/m       Constitutional: Sitting comfortably, in no acute distress. Vital signs noted  Neck:  no adenopathy, trachea midline and normal to palpation, thyroid normal to palpation  Cardiovascular:  regular rate and rhythm, no murmurs, clicks, or gallops  Respiratory:  normal respiratory rate and rhythm, lungs clear to auscultation  SKIN: No jaundice/pallor/rash.   Psychiatric: mentation appears normal and affect normal/bright      A/P    ICD-10-CM    1. Generalized anxiety disorder  F41.1 escitalopram (LEXAPRO) 10 MG tablet   2. Adjustment disorder with depressed mood  F43.21 escitalopram (LEXAPRO) 10 MG tablet   3. Encounter for screening mammogram for breast cancer  Z12.31 Mammo Screening digital (bilat)     Radiology Referral   4. Special screening for malignant " neoplasms, colon  Z12.11 Adult Gastro Ref - Procedure Only       DISCUSSION:  Anxiety/depression:  Symptoms of anxiety and depression are improved, especially when taking escitalopram medication. However agreed to restart on escitalopram 10 mg tablet, but start by taking 1/2 tablet daily for 2 weeks, which was the same as previous dose. If doing well, no side effects after 2 weeks, recommended increase up to 1 full tablet daily, which is a higher dose than before. This will ideally offer additional help with more anxiety (stress, worry, irritability) and depression (low mood, crying, lack of motivation). Fortunately, I feel that Hortencia is safe. Continue using coping techniques of reading bible, listening to music. Refilled 90 day supply, so please return to clinic in November to discuss or contact me sooner with concerns.     Chest pain:  Suspect related anxiety, as it happens more so on stressful days. Only lasts for a few minutes, and does not correlate with exertion. Should improve with medication, but contact me if worsening, or ER if persistent.     follow up visit: 3 months    Michelle Green PA-C  [facility]

## 2021-08-19 NOTE — NURSING NOTE
Chief Complaint   Patient presents with     Recheck Medication     follow up on anxiety and stress, ran out of escitalopram but even before that anxiety was becoming worse, feels that she may want to try a stronger dose      Pre-visit Screening:  Immunizations:  up to date  Colonoscopy:  is due and ordered today  Mammogram: is due and ordered today  Asthma Action Test/Plan:  NA  PHQ9:  Given today  GAD7:  Given today   Questioned patient about current smoking habits Pt. has never smoked.  Ok to leave detailed message on voice mail for today's visit only Yes, phone # [phone]

## 2021-08-20 ASSESSMENT — ANXIETY QUESTIONNAIRES: GAD7 TOTAL SCORE: 9

## 2021-10-23 ENCOUNTER — HOSPITAL ENCOUNTER (EMERGENCY)
Facility: CLINIC | Age: 51
Discharge: HOME | End: 2021-10-23
Attending: PHYSICIAN ASSISTANT | Admitting: PHYSICIAN ASSISTANT
Payer: COMMERCIAL

## 2021-10-23 ENCOUNTER — APPOINTMENT (OUTPATIENT)
Dept: GENERAL RADIOLOGY | Facility: CLINIC | Age: 51
End: 2021-10-23
Attending: PHYSICIAN ASSISTANT
Payer: COMMERCIAL

## 2021-10-23 VITALS
HEART RATE: 55 BPM | DIASTOLIC BLOOD PRESSURE: 46 MMHG | SYSTOLIC BLOOD PRESSURE: 114 MMHG | OXYGEN SATURATION: 98 % | TEMPERATURE: 98.6 F | RESPIRATION RATE: 16 BRPM

## 2021-10-23 DIAGNOSIS — M25.562 BILATERAL KNEE PAIN: ICD-10-CM

## 2021-10-23 DIAGNOSIS — M25.561 BILATERAL KNEE PAIN: ICD-10-CM
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
## 2021-10-23 DIAGNOSIS — M79.642 BILATERAL HAND PAIN: ICD-10-CM

## 2021-10-23 DIAGNOSIS — M79.643 TENDERNESS OF ANATOMICAL SNUFFBOX: ICD-10-CM

## 2021-10-23 DIAGNOSIS — M79.641 BILATERAL HAND PAIN: ICD-10-CM

## 2021-10-23 PROCEDURE — 99285 EMERGENCY DEPT VISIT HI MDM: CPT

## 2021-10-23 PROCEDURE — 73562 X-RAY EXAM OF KNEE 3: CPT | Mod: RT

## 2021-10-23 PROCEDURE — 73110 X-RAY EXAM OF WRIST: CPT | Mod: RT

## 2021-10-23 PROCEDURE — 73130 X-RAY EXAM OF HAND: CPT | Mod: RT

## 2021-10-23 PROCEDURE — 250N000013 HC RX MED GY IP 250 OP 250 PS 637: Performed by: PHYSICIAN ASSISTANT

## 2021-10-23 RX ORDER — HYDROCODONE BITARTRATE AND ACETAMINOPHEN 5; 325 MG/1; MG/1
1 TABLET ORAL EVERY 6 HOURS PRN
Qty: 10 TABLET | Refills: 0 | Status: SHIPPED | OUTPATIENT
Start: 2021-10-23 | End: 2022-08-11

## 2021-10-23 RX ORDER — HYDROCODONE BITARTRATE AND ACETAMINOPHEN 5; 325 MG/1; MG/1
1 TABLET ORAL ONCE
Status: COMPLETED | OUTPATIENT
Start: 2021-10-23 | End: 2021-10-23

## 2021-10-23 RX ADMIN — HYDROCODONE BITARTRATE AND ACETAMINOPHEN 1 TABLET: 5; 325 TABLET ORAL at 22:17

## 2021-10-23 ASSESSMENT — ENCOUNTER SYMPTOMS
ABDOMINAL PAIN: 0
NECK PAIN: 0
ARTHRALGIAS: 1
NUMBNESS: 0
DIZZINESS: 0
LIGHT-HEADEDNESS: 0
BACK PAIN: 0
SHORTNESS OF BREATH: 0

## 2021-10-23 NOTE — LETTER
October 23, 2021      To Whom It May Concern:      Hortencia Cooper was seen in our Emergency Department today, 10/23/21.  I expect her condition to improve over the next 2-3 days.  She may return to work when improved.    Sincerely,        Toya ALICEA RN

## 2021-10-24 NOTE — DISCHARGE INSTRUCTIONS
Wear splint as much as possible until seen by orthopedics and directed otherwise.    For pain, you can take up to 1000 mg or 1 g of Tylenol.  You can take 600 mg of ibuprofen at one time.  You can alternate these medications every 3 hours.  Always take ibuprofen with food.  Never take more than 4 g (4000 mg) of Tylenol or 3200mg of ibuprofen in one day.  Do not take this amount for more than 1 week at a time.     Use oxycodone for breakthrough pain not controlled by Tylenol and ibuprofen. Oxycodone is sedating. Do not drive after taking.

## 2021-10-24 NOTE — ED NOTES
Right wrist/ thumb spica applied by RN. Patient tolerated well and able to demonstrate back teaching. Patient also given a band aid for left thumb per request.

## 2021-10-24 NOTE — ED PROVIDER NOTES
History   Chief Complaint:  Fall    HPI   Hortencia Cooper is a 51 year old female who presents for evaluation after a fall. The patient reports that she tripped on the sidewalk while leaving work early this morning. She caught herself with her hands and onto her knees. She complains primarily of pain to her right knee with mild swelling. She is able to ambulate despite pain. No numbness or tingling. She denies hitting her head or loss of consciousness. She denies preceding chest pain, shortness of breath, or dizziness. She denies neck pain, back pain, or abdominal pain. She took ibuprofen with mild relief.     Review of Systems   Respiratory: Negative for shortness of breath.    Cardiovascular: Negative for chest pain.   Gastrointestinal: Negative for abdominal pain.   Musculoskeletal: Positive for arthralgias (knee pain). Negative for back pain, gait problem and neck pain.   Neurological: Negative for dizziness, syncope, light-headedness and numbness.   All other systems reviewed and are negative.      Allergies:  Percocet [Oxycodone-Acetaminophen]    Medications:  Lexapro    Past Medical History:    Acute pyelonephritis   Lumbago   Adjustment disorder with depressed mood    Past Surgical History:     delivery x2  D&C     Family History:    Diabetes - sister, father, brothers    Social History:  Presents to the ED: with her spouse   PCP: Michelle Green    Physical Exam     Patient Vitals for the past 24 hrs:   BP Temp Temp src Pulse Resp SpO2   10/23/21 2330 114/46 -- -- 55 -- 98 %   10/23/21 2135 112/65 98.6  F (37  C) Oral 63 16 98 %       Physical Exam  Constitutional: Pleasant. Cooperative.   Eyes: Pupils equally round and reactive  HENT: No scalp hematoma. No scalp tenderness. No bony step-off or crepitus. No facial bone tenderness or instability. No periorbital ecchymosis or Sanders signs. Oropharynx is normal with moist mucus membranes. No evidence for intraoral injury.  Cardiovascular:  Regular rate and rhythm and without murmurs.  Respiratory: Normal respiratory effort, lungs are clear bilaterally.  GI: Abdomen is soft, non-tender, non-distended. No guarding, rebound, or rigidity.  Musculoskeletal: No midline cervical, thoracic, or lumbar tenderness. Normal painless ROM of the neck. No clavicular tenderness. TTP to bilateral wrists, metacarpals, no other upper extremity tenderness. TTP to bilateral knees, primarily to patellae, otherwise no other lower extremity tenderness. Normal ROM of extremities. No tenderness with compression of the rib cage or pelvis. 2+ radial pulses. 2+ PT pulses.  Skin: No rashes. No lacerations or abrasions noted.  Neurologic: Cranial nerves grossly intact, normal cognition, no focal deficits. Alert and oriented x 3. GCS 15. Sensation intact to all extremities.  Psychiatric: Normal affect.  Nursing notes and vital signs reviewed.    Emergency Department Course     Imaging:  XR Hand Right G/E 3 Views  Normal joint spaces and alignment. No fracture.    XR Knee Right 3 Views  Mild degenerative changes in the medial joint space. Slight lateral patellar subluxation. No fracture. No joint effusion.    XR Wrist Right G/E 3 Views  Normal joint spaces and alignment. No fracture.    Reading per radiology.    Emergency Department Course:    Reviewed:  I reviewed the patient's nursing notes, vitals and past medical history.     Assessments:  2200 I performed an exam of the patient in room ED13 as documented above.   I updated the patient on results and discussed plan of care.    Interventions:  2217 Norco, 1 tablet, oral     Disposition:  The patient was discharged to home.     Impression & Plan     Medical Decision Making:  Hortencia Cooper is a 51 year old female who presents to the emergency department today for evaluation after a fall. See HPI as above for additional details. Vitals and physical exam as above. Imaging obtained as above after discussion with patient. No evidence  for acute bony abnormality. Patient did not hit her head, no LOC. Full trauma exam performed without need for further imaging at this time. Patient did have snuffbox tenderness to right wrist. Will provide prefabricated thumb spica splint, have her follow up with orthopedics for further evaluation. Tylenol and ibuprofen for pain. Short course of Norco provided as well. Discussed narcotic precautions. Felt patient was safe for discharge to home. Discussed reasons to return. All questions answered. Patient discharged to home in stable condition.    Diagnosis:    ICD-10-CM    1. Fall, initial encounter  W19.XXXA    2. Bilateral hand pain  M79.641     M79.642    3. Bilateral knee pain  M25.561     M25.562    4. Tenderness of anatomical snuffbox  M79.643        Discharge Medications:   Discharge Medication List as of 10/23/2021 11:20 PM      START taking these medications    Details   HYDROcodone-acetaminophen (NORCO) 5-325 MG tablet Take 1 tablet by mouth every 6 hours as needed for severe pain, Disp-10 tablet, R-0, Local Print           Scribe Disclosure:  I, Chelsy Waterman, am serving as a scribe at 9:59 PM on 10/23/2021 to document services personally performed by Lazarus Zacarias PA-C based on my observations and the provider's statements to me.    This note was completed in part using Dragon voice recognition software. Although reviewed after completion, some word and grammatical errors may occur.      Lazarus Zacarias PA-C  10/24/21 0011

## 2021-10-24 NOTE — ED TRIAGE NOTES
Pt tripped over her left foot this morning while walking down a sidewalk.  Landed with outstretched hands.  C/o bilateral wrist pain and bilateral knee pain.

## 2021-10-25 ENCOUNTER — CARE COORDINATION (OUTPATIENT)
Dept: FAMILY MEDICINE | Facility: CLINIC | Age: 51
End: 2021-10-25

## 2021-10-25 ENCOUNTER — TRANSFERRED RECORDS (OUTPATIENT)
Dept: FAMILY MEDICINE | Facility: CLINIC | Age: 51
End: 2021-10-25

## 2021-10-25 NOTE — PROGRESS NOTES
Care Coordination Initial Assessment    The patient was seen at [facility] ER on 10/23/2021 for a fall. She was discharged with instructions to follow up with orthopedics.    PCP: Michelle Green    Referral Source:  ED/IP List    Utilization:   ED visits in last year: 1  Last PCP Appt.: 8/19/21  Upcoming Appt.: seeing orthopedics today     Health Maintenance Reviewed: Yes    Current Medical Health Concerns:   Please see patients current medical problem list.     Patient/Caregiver Understanding: yes    Medication Management:   Patient has understanding of regimen and is adherent:  Yes    Functional Status:   Independent with all ADL/IADL's    Current Behavioral Health Concerns:   No concerns    Patient/Caregiver Understanding:  yes    Psychosocial:  No concerns    Gaps:    N/A    Resources Given:    No resources were needed by the patient as she was able to get in with orthopedics today.     Plan:   I called the patient to see how she is doing and she stated that she is doing better and is doing her follow up with orthopedics today. She will call us if there is anything she needs from us.

## 2021-11-08 NOTE — TELEPHONE ENCOUNTER
Denied refill request for escitalopram. Filled at appt on 8/19/2021 to return in Nov.  Pt is due for anxiety med recheck.

## 2022-08-11 ENCOUNTER — OFFICE VISIT (OUTPATIENT)
Dept: FAMILY MEDICINE | Facility: CLINIC | Age: 52
End: 2022-08-11

## 2022-08-11 VITALS
BODY MASS INDEX: 29.77 KG/M2 | TEMPERATURE: 98.2 F | HEART RATE: 74 BPM | OXYGEN SATURATION: 98 % | WEIGHT: 168 LBS | DIASTOLIC BLOOD PRESSURE: 72 MMHG | HEIGHT: 63 IN | SYSTOLIC BLOOD PRESSURE: 106 MMHG

## 2022-08-11 DIAGNOSIS — Z12.11 SCREEN FOR COLON CANCER: ICD-10-CM

## 2022-08-11 DIAGNOSIS — F41.1 GENERALIZED ANXIETY DISORDER: ICD-10-CM

## 2022-08-11 DIAGNOSIS — F41.9 ANXIETY: ICD-10-CM

## 2022-08-11 DIAGNOSIS — Z12.31 VISIT FOR SCREENING MAMMOGRAM: ICD-10-CM

## 2022-08-11 DIAGNOSIS — F43.21 ADJUSTMENT DISORDER WITH DEPRESSED MOOD: Primary | ICD-10-CM

## 2022-08-11 PROCEDURE — 99214 OFFICE O/P EST MOD 30 MIN: CPT | Performed by: FAMILY MEDICINE

## 2022-08-11 RX ORDER — ESCITALOPRAM OXALATE 10 MG/1
10 TABLET ORAL DAILY
Qty: 90 TABLET | Refills: 1 | Status: CANCELLED | OUTPATIENT
Start: 2022-08-11

## 2022-08-11 RX ORDER — ESCITALOPRAM OXALATE 20 MG/1
20 TABLET ORAL DAILY
Qty: 90 TABLET | Refills: 1 | Status: SHIPPED | OUTPATIENT
Start: 2022-08-11 | End: 2023-03-08

## 2022-08-11 ASSESSMENT — ANXIETY QUESTIONNAIRES
5. BEING SO RESTLESS THAT IT IS HARD TO SIT STILL: MORE THAN HALF THE DAYS
2. NOT BEING ABLE TO STOP OR CONTROL WORRYING: MORE THAN HALF THE DAYS
7. FEELING AFRAID AS IF SOMETHING AWFUL MIGHT HAPPEN: NEARLY EVERY DAY
3. WORRYING TOO MUCH ABOUT DIFFERENT THINGS: NEARLY EVERY DAY
IF YOU CHECKED OFF ANY PROBLEMS ON THIS QUESTIONNAIRE, HOW DIFFICULT HAVE THESE PROBLEMS MADE IT FOR YOU TO DO YOUR WORK, TAKE CARE OF THINGS AT HOME, OR GET ALONG WITH OTHER PEOPLE: SOMEWHAT DIFFICULT
1. FEELING NERVOUS, ANXIOUS, OR ON EDGE: NEARLY EVERY DAY
6. BECOMING EASILY ANNOYED OR IRRITABLE: MORE THAN HALF THE DAYS
GAD7 TOTAL SCORE: 18
GAD7 TOTAL SCORE: 18

## 2022-08-11 ASSESSMENT — PATIENT HEALTH QUESTIONNAIRE - PHQ9
SUM OF ALL RESPONSES TO PHQ QUESTIONS 1-9: 18
5. POOR APPETITE OR OVEREATING: NEARLY EVERY DAY

## 2022-08-11 NOTE — NURSING NOTE
Chief Complaint   Patient presents with     Recheck Medication     Refill medications, non-fasting today      Pre-visit Screening:  Immunizations:  not up to date - shingrix at pharmacy  Colonoscopy:  is due and to be scheduled by patient for later completion  Mammogram: is due and to be scheduled by patient for later completion  Asthma Action Test/Plan:  NA  PHQ9:  Done today  GAD7:  Done today  Questioned patient about current smoking habits Pt. has never smoked.  Ok to leave detailed message on voice mail for today's visit only Yes, phone # [phone]

## 2022-08-11 NOTE — PROGRESS NOTES
"Assessment & Plan   Problem List Items Addressed This Visit        Behavioral    Adjustment disorder with depressed mood - Primary    Relevant Medications    escitalopram (LEXAPRO) 20 MG tablet      Other Visit Diagnoses     Anxiety        Relevant Medications    escitalopram (LEXAPRO) 20 MG tablet    Generalized anxiety disorder        Relevant Medications    escitalopram (LEXAPRO) 20 MG tablet    Visit for screening mammogram        Relevant Orders    Mammo Screening digital (bilat)    Radiology Referral    Screen for colon cancer        Relevant Orders    COLOGUARD(EXACT [employer]) (Completed)         1. Adjustment disorder with depressed mood  Increase dose of lexapro, discussed side effects. Recheck in 6 months come in sooner if needed.  - escitalopram (LEXAPRO) 20 MG tablet; Take 1 tablet (20 mg) by mouth daily  Dispense: 90 tablet; Refill: 1    2. Anxiety    - escitalopram (LEXAPRO) 20 MG tablet; Take 1 tablet (20 mg) by mouth daily  Dispense: 90 tablet; Refill: 1    3. Generalized anxiety disorder      4. Visit for screening mammogram    - Mammo Screening digital (bilat); Future  - Radiology Referral; Future    5. Screen for colon cancer    - COLOGUARD([employer])           BMI:   Estimated body mass index is 29.76 kg/m  as calculated from the following:    Height as of this encounter: 1.6 m (5' 3\").    Weight as of this encounter: 76.2 kg (168 lb).         FUTURE APPOINTMENTS:       - Follow-up visit in 6 months.    No follow-ups on file.    Edelmira Munguia MD  [facility]    Subjective     Nursing Notes:   Brooke Bear CMA  8/11/2022  9:51 AM  Signed  Chief Complaint   Patient presents with     Recheck Medication     Refill medications, non-fasting today      Pre-visit Screening:  Immunizations:  not up to date - shingrix at pharmacy  Colonoscopy:  is due and to be scheduled by patient for later completion  Mammogram: is due and to be scheduled by patient for later " "completion  Asthma Action Test/Plan:  NA  PHQ9:  Done today  GAD7:  Done today  Questioned patient about current smoking habits Pt. has never smoked.  Ok to leave detailed message on voice mail for today's visit only Yes, phone # [phone]           Hortencia Cooper is a 52 year old female who presents to clinic today for the following health issues   HPI     Here to followup on her depression and anxiety. Is having issues with work and her relationships. A lot of times she is crying. Is feeling overwhelmed. Worse since last year. Denies SI.         Review of Systems   Constitutional, HEENT, cardiovascular, pulmonary, gi and gu systems are negative, except as otherwise noted.      Objective    /72 (BP Location: Left arm, Patient Position: Sitting, Cuff Size: Adult Large)   Pulse 74   Temp 98.2  F (36.8  C) (Temporal)   Ht 1.6 m (5' 3\")   Wt 76.2 kg (168 lb)   SpO2 98%   BMI 29.76 kg/m    Body mass index is 29.76 kg/m .  Physical Exam   GENERAL: healthy, alert and no distress  MS: no gross musculoskeletal defects noted, no edema  NEURO: Normal strength and tone, mentation intact and speech normal  PSYCH: mentation appears normal, affect normal/bright          "

## 2022-10-01 LAB — NONINV COLON CA DNA+OCC BLD SCRN STL QL: NORMAL

## 2023-01-30 ENCOUNTER — OFFICE VISIT (OUTPATIENT)
Dept: FAMILY MEDICINE | Facility: CLINIC | Age: 53
End: 2023-01-30

## 2023-01-30 VITALS
OXYGEN SATURATION: 99 % | DIASTOLIC BLOOD PRESSURE: 68 MMHG | HEIGHT: 63 IN | BODY MASS INDEX: 29.7 KG/M2 | HEART RATE: 75 BPM | WEIGHT: 167.6 LBS | SYSTOLIC BLOOD PRESSURE: 104 MMHG | TEMPERATURE: 97.9 F

## 2023-01-30 DIAGNOSIS — R00.2 PALPITATIONS: Primary | ICD-10-CM

## 2023-01-30 LAB
% GRANULOCYTES: 37.8 %
ALBUMIN SERPL-MCNC: 4.4 G/DL (ref 3.6–5.1)
ALBUMIN/GLOB SERPL: 1.6 {RATIO} (ref 1–2.5)
ALP SERPL-CCNC: 57 U/L (ref 33–130)
ALT 1742-6: 9 U/L (ref 0–32)
AST 1920-8: 10 U/L (ref 0–35)
BILIRUB SERPL-MCNC: 0.6 MG/DL (ref 0.2–1.2)
BUN SERPL-MCNC: 15 MG/DL (ref 7–25)
BUN/CREATININE RATIO: 23.1 (ref 6–22)
CALCIUM SERPL-MCNC: 9.5 MG/DL (ref 8.6–10.3)
CHLORIDE SERPLBLD-SCNC: 102.2 MMOL/L (ref 98–110)
CO2 SERPL-SCNC: 30.5 MMOL/L (ref 20–32)
CREAT SERPL-MCNC: 0.65 MG/DL (ref 0.6–1.3)
GLOBULIN, CALCULATED - QUEST: 2.7 (ref 1.9–3.7)
GLUCOSE SERPL-MCNC: 87 MG/DL (ref 60–99)
HCT VFR BLD AUTO: 40 % (ref 35–47)
HEMOGLOBIN: 13.3 G/DL (ref 11.7–15.7)
LYMPHOCYTES NFR BLD AUTO: 52.4 %
MCH RBC QN AUTO: 27.7 PG (ref 26–33)
MCHC RBC AUTO-ENTMCNC: 33.3 G/DL (ref 31–36)
MCV RBC AUTO: 83.2 FL (ref 78–100)
MONOCYTES NFR BLD AUTO: 9.8 %
PLATELET COUNT - QUEST: 270 10^9/L (ref 150–375)
POTASSIUM SERPL-SCNC: 3.61 MMOL/L (ref 3.5–5.3)
PROT SERPL-MCNC: 7.1 G/DL (ref 6.1–8.1)
RBC # BLD AUTO: 4.81 10*12/L (ref 3.8–5.2)
SODIUM SERPL-SCNC: 140.5 MMOL/L (ref 135–146)
WBC # BLD AUTO: 5.2 10*9/L (ref 4–11)

## 2023-01-30 PROCEDURE — 80053 COMPREHEN METABOLIC PANEL: CPT | Performed by: FAMILY MEDICINE

## 2023-01-30 PROCEDURE — 85025 COMPLETE CBC W/AUTO DIFF WBC: CPT | Performed by: FAMILY MEDICINE

## 2023-01-30 PROCEDURE — 93000 ELECTROCARDIOGRAM COMPLETE: CPT | Performed by: FAMILY MEDICINE

## 2023-01-30 PROCEDURE — 36415 COLL VENOUS BLD VENIPUNCTURE: CPT | Performed by: FAMILY MEDICINE

## 2023-01-30 PROCEDURE — 99214 OFFICE O/P EST MOD 30 MIN: CPT | Mod: 25 | Performed by: FAMILY MEDICINE

## 2023-01-30 NOTE — PROGRESS NOTES
"Assessment & Plan   Problem List Items Addressed This Visit    None  Visit Diagnoses     Palpitations    -  Primary    Relevant Orders    VENOUS COLLECTION (Completed)    HEMOGRAM PLATELET DIFF (BFP) (Completed)    Comprehensive Metobolic Panel (BFP)    TSH WITH FREE T4 REFLEX ([facility])    EKG 12-lead complete w/read - Clinics (Completed)    Echocardiogram Complete    Adult Leadless EKG Monitor 3 to 7 Days         1. Palpitations  Palpitations, no current symptoms. Check labs, ekg done today. She is in sinus rhythm. Referral for echocardiogram and zio patch. Recheck per results.  - VENOUS COLLECTION  - HEMOGRAM PLATELET DIFF (BFP)  - Comprehensive Metobolic Panel (BFP)  - TSH WITH FREE T4 REFLEX ([facility])  - EKG 12-lead complete w/read - Clinics  - Echocardiogram Complete; Future  - Adult Leadless EKG Monitor 3 to 7 Days; Future           BMI:   Estimated body mass index is 29.69 kg/m  as calculated from the following:    Height as of this encounter: 1.6 m (5' 3\").    Weight as of this encounter: 76 kg (167 lb 9.6 oz).         FUTURE APPOINTMENTS:       - Follow-up visit per results.    No follow-ups on file.    Edelmira Munguia MD  [facility]    Subjective     Nursing Notes:   Brooke Bear CMA  1/30/2023 10:32 AM  Signed  Chief Complaint   Patient presents with     Palpitations     Irregular heart beast on and off, lasting 1 minute at a time, can feel heart beating rapidly out of her chest, started 1 week ago, had a bout of dizziness when walking quickly at work last week, no chest pain     Pre-visit Screening:  Immunizations:  up to date  Colonoscopy:  NA  Mammogram: NA  Asthma Action Test/Plan:  nA  PHQ9:  NA  GAD7:  NA  Questioned patient about current smoking habits Pt. has never smoked.  Ok to leave detailed message on voice mail for today's visit only Yes, phone # [phone]           Hortencia Cooper is a 52 year old female who presents to clinic today for the following health issues " "    HPI     Complains of palpitations, sometimes for a minute and sometimes for a few seconds, for 1 1/2 weeks. First time, not sure why. Not a lot of caffeine, no otc supplements. No other sx with this: no chest pain, shortness of breath. Only feels heart racing.   Working overnight and is always up. Is very active at her job. Is a supervisor at [employer], and works overnight.         Review of Systems   Constitutional, HEENT, cardiovascular, pulmonary, gi and gu systems are negative, except as otherwise noted.      Objective    /68 (BP Location: Right arm, Patient Position: Sitting, Cuff Size: Adult Large)   Pulse 75   Temp 97.9  F (36.6  C) (Temporal)   Ht 1.6 m (5' 3\")   Wt 76 kg (167 lb 9.6 oz)   SpO2 99%   BMI 29.69 kg/m    Body mass index is 29.69 kg/m .  Physical Exam   GENERAL: healthy, alert and no distress  RESP: lungs clear to auscultation - no rales, rhonchi or wheezes  CV: regular rate and rhythm, normal S1 S2, no S3 or S4, no murmur, click or rub, no peripheral edema and peripheral pulses strong  MS: no gross musculoskeletal defects noted, no edema  NEURO: Normal strength and tone, mentation intact and speech normal  PSYCH: mentation appears normal, affect normal/bright    Results for orders placed or performed in visit on 01/30/23   HEMOGRAM PLATELET DIFF (BFP)     Status: None   Result Value Ref Range    WBC 5.2 4.0 - 11 10*9/L    RBC Count 4.81 3.8 - 5.2 10*12/L    Hemoglobin 13.3 11.7 - 15.7 g/dL    Hematocrit 40.0 35.0 - 47.0 %    MCV 83.2 78 - 100 fL    MCH 27.7 26 - 33 pg    MCHC 33.3 31 - 36 g/dL    Platelet Count 270 150 - 375 10^9/L    % Granulocytes 37.8 %    % Lymphocytes 52.4 %    % Monocytes 9.8 %   Comprehensive Metobolic Panel (BFP)     Status: Abnormal   Result Value Ref Range    Carbon Dioxide 30.5 20 - 32 mmol/L    Creatinine 0.65 0.60 - 1.30 mg/dL    Glucose 87 60 - 99 mg/dL    Sodium 140.5 135 - 146 mmol/L    Potassium 3.61 3.5 - 5.3 mmol/L    Chloride 102.2 98 - 110 " mmol/L    Protein Total 7.1 6.1 - 8.1 g/dL    Albumin 4.4 3.6 - 5.1 g/dL    Alkaline Phosphatase 57 33 - 130 U/L    ALT 9 0 - 32 U/L    AST 10 0 - 35 U/L    Bilirubin Total 0.6 0.2 - 1.2 mg/dL    Urea Nitrogen 15 7 - 25 mg/dL    Calcium 9.5 8.6 - 10.3 mg/dL    BUN/Creatinine Ratio 23.1 (A) 6 - 22    Globulin Calculated 2.7 1.9 - 3.7    A/G Ratio 1.6 1 - 2.5

## 2023-01-30 NOTE — NURSING NOTE
Chief Complaint   Patient presents with     Palpitations     Irregular heart beast on and off, lasting 1 minute at a time, can feel heart beating rapidly out of her chest, started 1 week ago, had a bout of dizziness when walking quickly at work last week, no chest pain     Pre-visit Screening:  Immunizations:  up to date  Colonoscopy:  NA  Mammogram: NA  Asthma Action Test/Plan:  nA  PHQ9:  NA  GAD7:  NA  Questioned patient about current smoking habits Pt. has never smoked.  Ok to leave detailed message on voice mail for today's visit only Yes, phone # [phone]

## 2023-01-30 NOTE — LETTER
January 31, 2023      Hortencia Cooper  [address] [address] [address]        Dear ,    We are writing to inform you of your test results.    Your labs were all in the normal range    Resulted Orders   HEMOGRAM PLATELET DIFF (BFP)   Result Value Ref Range    WBC 5.2 4.0 - 11 10*9/L    RBC Count 4.81 3.8 - 5.2 10*12/L    Hemoglobin 13.3 11.7 - 15.7 g/dL    Hematocrit 40.0 35.0 - 47.0 %    MCV 83.2 78 - 100 fL    MCH 27.7 26 - 33 pg    MCHC 33.3 31 - 36 g/dL    Platelet Count 270 150 - 375 10^9/L    % Granulocytes 37.8 %    % Lymphocytes 52.4 %    % Monocytes 9.8 %   Comprehensive Metobolic Panel (BFP)   Result Value Ref Range    Carbon Dioxide 30.5 20 - 32 mmol/L    Creatinine 0.65 0.60 - 1.30 mg/dL    Glucose 87 60 - 99 mg/dL    Sodium 140.5 135 - 146 mmol/L    Potassium 3.61 3.5 - 5.3 mmol/L    Chloride 102.2 98 - 110 mmol/L    Protein Total 7.1 6.1 - 8.1 g/dL    Albumin 4.4 3.6 - 5.1 g/dL    Alkaline Phosphatase 57 33 - 130 U/L    ALT 9 0 - 32 U/L    AST 10 0 - 35 U/L    Bilirubin Total 0.6 0.2 - 1.2 mg/dL    Urea Nitrogen 15 7 - 25 mg/dL    Calcium 9.5 8.6 - 10.3 mg/dL    BUN/Creatinine Ratio 23.1 (A) 6 - 22    Globulin Calculated 2.7 1.9 - 3.7    A/G Ratio 1.6 1 - 2.5   TSH WITH FREE T4 REFLEX ([facility])   Result Value Ref Range    TSH 0.65 mIU/L      Comment:                Reference Range                         > or = 20 Years  0.40-4.50                              Pregnancy Ranges            First trimester    0.26-2.66            Second trimester   0.55-2.73            Third trimester    0.43-2.91         If you have any questions or concerns, please call the clinic at the number listed above.       Sincerely,      Edelmira Munguia MD

## 2023-01-31 LAB — TSH SERPL-ACNC: 0.65 MIU/L

## 2023-03-08 ENCOUNTER — OFFICE VISIT (OUTPATIENT)
Dept: FAMILY MEDICINE | Facility: CLINIC | Age: 53
End: 2023-03-08

## 2023-03-08 VITALS
DIASTOLIC BLOOD PRESSURE: 66 MMHG | TEMPERATURE: 98.2 F | SYSTOLIC BLOOD PRESSURE: 106 MMHG | BODY MASS INDEX: 29.34 KG/M2 | OXYGEN SATURATION: 99 % | HEART RATE: 80 BPM | HEIGHT: 63 IN | WEIGHT: 165.6 LBS

## 2023-03-08 DIAGNOSIS — J36 TONSILLAR ABSCESS: Primary | ICD-10-CM

## 2023-03-08 DIAGNOSIS — E04.9 THYROID GOITER: ICD-10-CM

## 2023-03-08 NOTE — NURSING NOTE
Chief Complaint   Patient presents with     Throat Pain     Was at [facility] urgent care for throat pain a few weeks ago, they ordered CT of her neck, this showed abnormality to thyroid, wants to review this CT scan, would like to discuss referral to ENT for enlarged tonsils     Pre-visit Screening:  Immunizations:  not up to date - shingrix at pharmacy  Colonoscopy:  is due and to be scheduled by patient for later completion  Mammogram: is due and to be scheduled by patient for later completion  Asthma Action Test/Plan:  NA  PHQ9:  NA  GAD7:  NA  Questioned patient about current smoking habits Pt. has never smoked.  Ok to leave detailed message on voice mail for today's visit only Yes, phone # [phone]

## 2023-03-08 NOTE — PROGRESS NOTES
"Assessment & Plan   Problem List Items Addressed This Visit    None  Visit Diagnoses     Tonsillar abscess    -  Primary    Relevant Orders    Adult ENT  Referral - To a Non [facility] Location (Use POS/Location)    US Head Neck Soft Tissue    Radiology Referral    Thyroid goiter        Relevant Orders    Adult ENT  Referral - To a Non [facility] Location (Use POS/Location)    US Head Neck Soft Tissue    Radiology Referral         1. Tonsillar abscess  Continued symptoms, although she is feeling well.   - Adult ENT  Referral - To a Non [facility] Location (Use POS/Location)  - US Head Neck Soft Tissue  - Radiology Referral; Future    2. Thyroid goiter  Ultrasound of the neck, referral to ENT for further evaluation.  - Adult ENT  Referral - To a Non [facility] Location (Use POS/Location)  - US Head Neck Soft Tissue  - Radiology Referral; Future           BMI:   Estimated body mass index is 29.33 kg/m  as calculated from the following:    Height as of this encounter: 1.6 m (5' 3\").    Weight as of this encounter: 75.1 kg (165 lb 9.6 oz).         FUTURE APPOINTMENTS:       - Follow-up visit with ENT.    No follow-ups on file.    Edelmira Munguia MD  [facility]    Subjective     Nursing Notes:   Brooke Bear CMA  3/8/2023 10:58 AM  Signed  Chief Complaint   Patient presents with     Throat Pain     Was at [facility] urgent care for throat pain a few weeks ago, they ordered CT of her neck, this showed abnormality to thyroid, wants to review this CT scan, would like to discuss referral to ENT for enlarged tonsils     Pre-visit Screening:  Immunizations:  not up to date - shingrix at pharmacy  Colonoscopy:  is due and to be scheduled by patient for later completion  Mammogram: is due and to be scheduled by patient for later completion  Asthma Action Test/Plan:  NA  PHQ9:  NA  GAD7:  NA  Questioned patient about current smoking habits Pt. has " "never smoked.  Ok to leave detailed message on voice mail for today's visit only Yes, phone # [phone]           Hortencia Cooper is a 52 year old female who presents to clinic today for the following health issues     HPI     Went to urgent care a couple of weeks ago and had a ct scan, they said her thyroid was abnormal.  She also wants to see ENT to talk about getting her tonsils out. She had a sore throat and enlarged tonsils. Also had thyroid goiter. With nodules.  Also had tonsillar abscess, was treated with abx and steroids.    Hasn't noted any neck swelling, has some coughing a lot because something bothers her.  Before she was sick, felt like there was something in the throat, had a fever, but this is gone. And had some trouble swallowing.        Review of Systems   Constitutional, HEENT, cardiovascular, pulmonary, gi and gu systems are negative, except as otherwise noted.      Objective    /66 (BP Location: Left arm, Patient Position: Sitting, Cuff Size: Adult Large)   Pulse 80   Temp 98.2  F (36.8  C) (Temporal)   Ht 1.6 m (5' 3\")   Wt 75.1 kg (165 lb 9.6 oz)   SpO2 99%   BMI 29.33 kg/m    Body mass index is 29.33 kg/m .  Physical Exam   GENERAL: healthy, alert and no distress  EYES: Eyes grossly normal to inspection, PERRL and conjunctivae and sclerae normal  HENT: ear canals and TM's normal, nose and mouth without ulcers or lesions  MS: no gross musculoskeletal defects noted, no edema  NEURO: Normal strength and tone, mentation intact and speech normal  PSYCH: mentation appears normal, affect normal/bright  Unable to palpate, she had on clothing on the neck.          "

## 2023-03-16 ENCOUNTER — TELEPHONE (OUTPATIENT)
Dept: FAMILY MEDICINE | Facility: CLINIC | Age: 53
End: 2023-03-16

## 2023-03-16 NOTE — TELEPHONE ENCOUNTER
Hortencia Cooper called the clinic support line with the following:    Left message at 5:36pm yesterday that she missed your call    [phone]

## 2023-03-31 ENCOUNTER — TRANSFERRED RECORDS (OUTPATIENT)
Dept: FAMILY MEDICINE | Facility: CLINIC | Age: 53
End: 2023-03-31

## 2023-05-18 ENCOUNTER — HOSPITAL ENCOUNTER (OUTPATIENT)
Dept: CARDIOLOGY | Facility: CLINIC | Age: 53
Discharge: HOME | End: 2023-05-18
Attending: FAMILY MEDICINE
Payer: COMMERCIAL

## 2023-05-18 ENCOUNTER — TRANSFERRED RECORDS (OUTPATIENT)
Dept: HEALTH INFORMATION MANAGEMENT | Facility: CLINIC | Age: 53
End: 2023-05-18

## 2023-05-18 DIAGNOSIS — R00.2 PALPITATIONS: ICD-10-CM

## 2023-05-18 LAB — LVEF ECHO: NORMAL

## 2023-05-18 PROCEDURE — 93244 EXT ECG>48HR<7D REV&INTERPJ: CPT | Performed by: INTERNAL MEDICINE

## 2023-05-18 PROCEDURE — 93306 TTE W/DOPPLER COMPLETE: CPT | Mod: 26 | Performed by: INTERNAL MEDICINE

## 2023-05-18 PROCEDURE — 93306 TTE W/DOPPLER COMPLETE: CPT

## 2023-05-18 PROCEDURE — 93242 EXT ECG>48HR<7D RECORDING: CPT

## 2023-05-18 NOTE — LETTER
August 2, 2023      Hortencia Callesdiaz  [address] [address] [facility] [address]        Dear ,    We are writing to inform you of your test results.      Your monitor shows many episode of fast heart beat, some may be atrial fibrillation. Be sure that you see cardiology in followup to go over results      If you have any questions or concerns, please call the clinic at the number listed above.       Sincerely,      Edelmira Munguia MD

## 2023-06-21 ENCOUNTER — TRANSFERRED RECORDS (OUTPATIENT)
Dept: HEALTH INFORMATION MANAGEMENT | Facility: CLINIC | Age: 53
End: 2023-06-21
Payer: COMMERCIAL

## 2023-07-05 ENCOUNTER — TELEPHONE (OUTPATIENT)
Dept: FAMILY MEDICINE | Facility: CLINIC | Age: 53
End: 2023-07-05

## 2023-07-05 NOTE — TELEPHONE ENCOUNTER
Pt completed heart monitor. Had seen Dr. Munguia 1/2023 to discuss palpitations. Please have her schedule follow-up appt now that testing is complete.

## 2023-07-06 ENCOUNTER — OFFICE VISIT (OUTPATIENT)
Dept: FAMILY MEDICINE | Facility: CLINIC | Age: 53
End: 2023-07-06

## 2023-07-06 VITALS
OXYGEN SATURATION: 99 % | SYSTOLIC BLOOD PRESSURE: 108 MMHG | DIASTOLIC BLOOD PRESSURE: 72 MMHG | BODY MASS INDEX: 29.23 KG/M2 | WEIGHT: 165 LBS | HEART RATE: 90 BPM | HEIGHT: 63 IN | TEMPERATURE: 98.2 F

## 2023-07-06 DIAGNOSIS — I47.10 PAROXYSMAL SUPRAVENTRICULAR TACHYCARDIA (H): Primary | ICD-10-CM

## 2023-07-06 PROCEDURE — 99213 OFFICE O/P EST LOW 20 MIN: CPT | Performed by: PHYSICIAN ASSISTANT

## 2023-07-06 NOTE — PROGRESS NOTES
"  Assessment & Plan     Paroxysmal supraventricular tachycardia (H)-based on continued symptomatic SVT runs will refer to cardiology EP for management  Seek emergency care for any chest pain, worsening palpitations, dizziness, SOB, among other symptoms discussed  - Adult Cardiology [facility] Referral - To [facility]        Follow up with cardiology    No follow-ups on file.    Lazarus Sutton PA-C  [facility]    Subjective   Hortencia is a 53 year old, presenting for the following health issues:  Irregular Heart Beat (Having ongoing palpitations, review holter monitor results )    HPI     Pt presents for follow up on cardiac results. Normal echo 5/18/23, Holter shows extensive SVT runs. Pt was symptomatic during these runs.     Pt continues to have symptomatic SVT runs. Very random with when symptoms appear-last happened 30 minutes ago. No chest pain or SOB. No clear triggers to her SVT runs. Some dizziness, rarely, for a few seconds while working.       Review of Systems   Constitutional, HEENT, cardiovascular, pulmonary, gi and gu systems are negative, except as otherwise noted.      Objective    /72 (BP Location: Left arm, Patient Position: Sitting, Cuff Size: Adult Large)   Pulse 90   Temp 98.2  F (36.8  C) (Temporal)   Ht 1.6 m (5' 3\")   Wt 74.8 kg (165 lb)   SpO2 99%   BMI 29.23 kg/m    Body mass index is 29.23 kg/m .  Physical Exam   GENERAL: healthy, alert and no distress  NECK: no adenopathy, no asymmetry, masses, or scars and thyroid normal to palpation  RESP: lungs clear to auscultation - no rales, rhonchi or wheezes  CV: regular rate and rhythm, normal S1 S2, no S3 or S4, no murmur, click or rub, no peripheral edema and peripheral pulses strong  ABDOMEN: soft, nontender, no hepatosplenomegaly, no masses and bowel sounds normal  MS: no gross musculoskeletal defects noted, no edema  NEURO: Normal strength and tone, mentation intact and speech normal            "

## 2023-07-06 NOTE — NURSING NOTE
Chief Complaint   Patient presents with     Irregular Heart Beat     Having ongoing palpitations, review holter monitor results      Pre-visit Screening:  Immunizations:  not up to date - shingrix at pharmacy  Colonoscopy:  is due and to be scheduled by patient for later completion  Mammogram: is due and to be scheduled by patient for later completion  Asthma Action Test/Plan:  NA  PHQ9:  NA  GAD7:  NA  Questioned patient about current smoking habits Pt. has never smoked.  Ok to leave detailed message on voice mail for today's visit only Yes, phone # [phone]

## 2023-08-03 ENCOUNTER — TELEPHONE (OUTPATIENT)
Dept: CARDIOLOGY | Facility: CLINIC | Age: 53
End: 2023-08-03
Payer: COMMERCIAL

## 2023-08-03 NOTE — TELEPHONE ENCOUNTER
Calling to see if pt OV could be sooner because of possible A Fib/flutter on ZP. Discussed that  at this time there are no sooner visits. Spoke to  and pt will be placed on the list if one comes available sooner. Donald

## 2023-08-27 PROBLEM — I48.0 PAROXYSMAL ATRIAL FIBRILLATION (H): Status: ACTIVE | Noted: 2023-08-27

## 2023-08-27 PROBLEM — I47.10 SVT (SUPRAVENTRICULAR TACHYCARDIA) (H): Status: ACTIVE | Noted: 2023-08-27

## 2023-08-27 NOTE — PROGRESS NOTES
[facility]  Cardiac Electrophysiology  [address]  [place] [address]   Office: [phone]  Fax: [phone]     Cardiac Electrophysiology Consultation    Patient: Hortnecia Cooper   : 1970     Referring Provider: Lazarus Sutton MD  Primary Care Provider: Michelle Green PA-C    CHIEF COMPLAINT/REASON FOR CONSULTATION  Supraventricular tachycardia  Paroxysmal atrial fibrillation    Assessment/Recommendations   Hortencia Cooper is a very pleasant 53 year old female with supraventricular tachycardia, paroxysmal atrial fibrillation referred by Dr. Sutton for consultation regarding SVT, paroxysmal atrial fibrillation.    Supraventricular tachycardia - symptomatic with palpitations.  Likely AT, though other SVT mechanisms are possible.  We reviewed SVT mechanisms and reviewed treatment options including electrophysiology study and ablation vs continued medical therapy.  We reviewed the nature of EP studies and ablation for SVT, success rates depending on inducibility and specific SVT mechanism, procedural risks (including groin hematoma, tamponade, heart block, stroke) and recovery expectations.  She would prefer catheter ablation  - start metoprolol XL 25mg daily  - EPS/ablation for SVT (likely AT), MAC, hold metoprolol XL 7 days prior    Paroxysmal atrial fibrillation - brief, appears to be initiated by AT  [id number] 0-1  - SVT/AT management as above  - given her [id number] long term therapeutic anticoagulation is not indicated  - repeat ambulatory rhythm monitoring after SVT/AT therapy      Follow up: as above         History of Present Illness   Hortencia Cooper is a very pleasant 53 year old female with supraventricular tachycardia, paroxysmal atrial fibrillation referred by Dr. Sutton for consultation regarding SVT, paroxysmal atrial fibrillation.    Mrs. Cooper notes onset of palpitations intermittently with activity - this has progressed to become more  "frequent now occurring multiple times per day and occurring at rest and with activity.  She underwent Zio monitoring 5/2023 showing 1564 episodes of likely AT and paroxysmal AF.    She denies chest pain, syncope.  She drinks one cup of team per day.  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       Physical Examination  Review of Systems   VITALS: /72   Pulse 90   Ht 1.6 m (5' 3\")   Wt 82.1 kg (181 lb)   SpO2 98%   BMI 32.06 kg/m   - multiple instances of HR up to 130-140bpm lasting 20-30s during recording VS  Wt Readings from Last 3 Encounters:   07/06/23 74.8 kg (165 lb)   03/08/23 75.1 kg (165 lb 9.6 oz)   01/30/23 76 kg (167 lb 9.6 oz)     CONSTITUTIONAL: well nourished, comfortable, no distress  EYES:  Conjunctivae pink, sclerae clear.    E/N/T:  Oral mucosa pink  RESPIRATORY:  Respiratory effort is normal  CARDIOVASCULAR:  normal S1 and S2  GASTROINTESTINAL:  Abdomen without masses or tenderness  EXTREMITIES:  No clubbing or cyanosis.    MUSCULOSKELETAL:  Overall grossly normal muscle strength  SKIN:  Overall, skin warm and dry, no lesions.  NEURO/PSYCH:  Oriented x 3 with normal affect.   Constitutional:  No weight loss or loss of appetite    Eyes:  No difficulty with vision, no double vision, no dry eyes  ENT:  No sore throat, difficulty swallowing; changes in hearing or tinnitus  Cardiovascular: As detailed above  Respiratory:  No cough  Musculoskeletal  No joint pain, muscle aches  Neurologic:  No syncope, lightheadedness, fainting spells   Hematologic: No easy bruising, excessive bleeding tendency   Gastrointestinal:  No jaundice, abdominal pain or abdominal bloating  Genitourinary: No changes in urinary habits, no trouble urinating    Psychiatric: No anxiety or depression      Medical History  Surgical History   Past Medical History:   Diagnosis Date     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     Diabetes Father          in his 70's     Diabetes Sister      Diabetes Brother      Diabetes Type 2  Brother      Diabetes Brother      Colon Cancer Paternal Grandfather      Breast Cancer No family hx of         Social History     Tobacco Use     Smoking status: Never     Passive exposure: Never     Smokeless tobacco: Never     Tobacco comments:      outside   Substance Use Topics     Alcohol use: No     Drug use: No         Medications  Allergies     Current Outpatient Medications:      Cyanocobalamin (B-12) 1000 MCG TBCR, , Disp: , Rfl:      Zinc 100 MG TABS, , Disp: , Rfl:      Allergies   Allergen Reactions     Percocet [Oxycodone-Acetaminophen] Rash          Lab Results    Chemistry CBC Cardiac Enzymes/BNP/TSH/INR   Recent Labs   Lab Test 23  0000   .5   POTASSIUM 3.61   CHLORIDE 102.2   CO2 30.5   GLC 87   BUN 15  23.1*   CR 0.65   NILSA 9.5     Recent Labs   Lab Test 23  0000   CR 0.65          Recent Labs   Lab Test 23  1039   WBC 5.2   HGB 13.3   HCT 40.0   MCV 83.2        Recent Labs   Lab Test 23  1039   HGB 13.3    No results for input(s): TROPONINI in the last 05508 hours.  No results for input(s): BNP, NTBNPI, NTBNP in the last 69983 hours.  Recent Labs   Lab Test 23  1123   TSH 0.65     No results for input(s): INR in the last 10265 hours.      Data Review    ECGs (tracings independently reviewed)  2023 - SR 66bpm    Zio monitoring from 2023 to 2023 (duration 7d 1h) (independently reviewed)  Predominant underlying rhythm  was sinus rhythm, 50 to 133bpm, average 73bpm.  1564 episodes of supraventricular tachycardia - longest 9m 41s avg 154bpm, fastest 255bpm.  Paroxysmal atrial fibrillation (eg 5/21/2023 581319)  There were no pauses of greater than 3 seconds.  Rare supraventricular ectopic beats (<1%).  Rare premature ventricular contractions (<1%).  Symptom triggers (11) correlated to SR, SVT.      5/18/2023 TTE  The left ventricle is normal in structure, function and size.  The visual ejection fraction is 55-60%.  The right ventricle is normal in structure, function and size.       Cc: Lazarus Sutton MD, Michelle Green PA-C Amila Dilusha William, MD  8/30/2023  11:45 AM

## 2023-08-30 ENCOUNTER — OFFICE VISIT (OUTPATIENT)
Dept: CARDIOLOGY | Facility: CLINIC | Age: 53
End: 2023-08-30
Attending: PHYSICIAN ASSISTANT
Payer: COMMERCIAL

## 2023-08-30 VITALS
HEIGHT: 63 IN | OXYGEN SATURATION: 98 % | DIASTOLIC BLOOD PRESSURE: 72 MMHG | HEART RATE: 90 BPM | WEIGHT: 181 LBS | BODY MASS INDEX: 32.07 KG/M2 | SYSTOLIC BLOOD PRESSURE: 126 MMHG

## 2023-08-30 DIAGNOSIS — I47.10 SVT (SUPRAVENTRICULAR TACHYCARDIA) (H): Primary | ICD-10-CM

## 2023-08-30 DIAGNOSIS — I47.10 PAROXYSMAL SUPRAVENTRICULAR TACHYCARDIA (H): ICD-10-CM

## 2023-08-30 DIAGNOSIS — I48.0 PAROXYSMAL ATRIAL FIBRILLATION (H): ICD-10-CM

## 2023-08-30 PROCEDURE — 99205 OFFICE O/P NEW HI 60 MIN: CPT | Performed by: INTERNAL MEDICINE

## 2023-08-30 RX ORDER — METOPROLOL SUCCINATE 25 MG/1
25 TABLET, EXTENDED RELEASE ORAL DAILY
Qty: 30 TABLET | Refills: 11 | Status: SHIPPED | OUTPATIENT
Start: 2023-08-30 | End: 2023-10-04

## 2023-08-30 NOTE — LETTER
2023    Michelle Green PA-C  [address]  [facility] [address]    RE: Hortencia Cooper       Dear Colleague,     I had the pleasure of seeing Hortencia Cooper in the [facility].     [facility]  Cardiac Electrophysiology  [address]  [place] [address]   Office: [phone]  Fax: [phone]     Cardiac Electrophysiology Consultation    Patient: Hortencia Cooper   : 1970     Referring Provider: Lazarus Sutton MD  Primary Care Provider: Michelle Green PA-C    CHIEF COMPLAINT/REASON FOR CONSULTATION  Supraventricular tachycardia  Paroxysmal atrial fibrillation    Assessment/Recommendations   Hortencia Cooper is a 53 year old female with supraventricular tachycardia, paroxysmal atrial fibrillation referred by Dr. Sutton for consultation regarding SVT, paroxysmal atrial fibrillation.    Supraventricular tachycardia - symptomatic with palpitations.  Likely AT, though other SVT mechanisms are possible.  We reviewed SVT mechanisms and reviewed treatment options including electrophysiology study and ablation vs continued medical therapy.  We reviewed the nature of EP studies and ablation for SVT, success rates depending on inducibility and specific SVT mechanism, procedural risks (including groin hematoma, tamponade, heart block, stroke) and recovery expectations.  She would prefer catheter ablation  - start metoprolol XL 25mg daily  - EPS/ablation for SVT (likely AT), MAC, hold metoprolol XL 7 days prior    Paroxysmal atrial fibrillation - brief, appears to be initiated by AT  [id number] 0-1  - SVT/AT management as above  - given her [id number] long term therapeutic anticoagulation is not indicated  - repeat ambulatory rhythm monitoring after SVT/AT therapy      Follow up: as above         History of Present Illness   Hortencia Cooper is a 53 year old female with supraventricular tachycardia, paroxysmal atrial fibrillation referred  "by Dr. Sutton for consultation regarding SVT, paroxysmal atrial fibrillation.    Mrs. Cooper notes onset of palpitations intermittently with activity - this has progressed to become more frequent now occurring multiple times per day and occurring at rest and with activity.  She underwent Zio monitoring 5/2023 showing 1564 episodes of likely AT and paroxysmal AF.    She denies chest pain, syncope.  She drinks one cup of team per day.  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       Physical Examination  Review of Systems   VITALS: /72   Pulse 90   Ht 1.6 m (5' 3\")   Wt 82.1 kg (181 lb)   SpO2 98%   BMI 32.06 kg/m    Wt Readings from Last 3 Encounters:   07/06/23 74.8 kg (165 lb)   03/08/23 75.1 kg (165 lb 9.6 oz)   01/30/23 76 kg (167 lb 9.6 oz)     CONSTITUTIONAL: well nourished, comfortable, no distress  EYES:  Conjunctivae pink, sclerae clear.    E/N/T:  Oral mucosa pink  RESPIRATORY:  Respiratory effort is normal  CARDIOVASCULAR:  normal S1 and S2  GASTROINTESTINAL:  Abdomen without masses or tenderness  EXTREMITIES:  No clubbing or cyanosis.    MUSCULOSKELETAL:  Overall grossly normal muscle strength  SKIN:  Overall, skin warm and dry, no lesions.  NEURO/PSYCH:  Oriented x 3 with normal affect.   Constitutional:  No weight loss or loss of appetite    Eyes:  No difficulty with vision, no double vision, no dry eyes  ENT:  No sore throat, difficulty swallowing; changes in hearing or tinnitus  Cardiovascular: As detailed above  Respiratory:  No cough  Musculoskeletal  No joint pain, muscle aches  Neurologic:  No syncope, lightheadedness, fainting spells   Hematologic: No easy bruising, excessive bleeding tendency   Gastrointestinal:  No jaundice, abdominal pain or abdominal bloating  Genitourinary: No changes in urinary habits, no trouble urinating    Psychiatric: No anxiety or depression      Medical History  Surgical History   Past Medical History: "   Diagnosis Date    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    Diabetes Father          in his 70's    Diabetes Sister     Diabetes Brother     Diabetes Type 2  Brother     Diabetes Brother     Colon Cancer Paternal Grandfather     Breast Cancer No family hx of         Social History     Tobacco Use    Smoking status: Never     Passive exposure: Never    Smokeless tobacco: Never    Tobacco comments:      outside   Substance Use Topics    Alcohol use: No    Drug use: No         Medications  Allergies     Current Outpatient Medications:     Cyanocobalamin (B-12) 1000 MCG TBCR, , Disp: , Rfl:     Zinc 100 MG TABS, , Disp: , Rfl:      Allergies   Allergen Reactions    Percocet [Oxycodone-Acetaminophen] Rash          Lab Results    Chemistry CBC Cardiac Enzymes/BNP/TSH/INR   Recent Labs   Lab Test 23  0000   .5   POTASSIUM 3.61   CHLORIDE 102.2   CO2 30.5   GLC 87   BUN 15  23.1*   CR 0.65   NILSA 9.5     Recent Labs   Lab Test 23  0000   CR 0.65          Recent Labs   Lab Test 23  1039   WBC 5.2   HGB 13.3   HCT 40.0   MCV 83.2        Recent Labs   Lab Test 23  1039   HGB 13.3    No results for input(s): TROPONINI in the last 97004 hours.  No results for input(s): BNP, NTBNPI, NTBNP in the last 38424 hours.  Recent Labs   Lab Test 23  1123   TSH 0.65     No results for input(s): INR in the last 38899 hours.      Data Review    ECGs (tracings independently reviewed)  2023 - SR 66bpm    Zio monitoring from 2023 to  5/25/2023 (duration 7d 1h) (independently reviewed)  Predominant underlying rhythm was sinus rhythm, 50 to 133bpm, average 73bpm.  1564 episodes of supraventricular tachycardia - longest 9m 41s avg 154bpm, fastest 255bpm.  Paroxysmal atrial fibrillation (eg 5/21/2023 [id number])  There were no pauses of greater than 3 seconds.  Rare supraventricular ectopic beats (<1%).  Rare premature ventricular contractions (<1%).  Symptom triggers (11) correlated to SR, SVT.      5/18/2023 TTE  The left ventricle is normal in structure, function and size.  The visual ejection fraction is 55-60%.  The right ventricle is normal in structure, function and size.       Cc: Lazarus Sutton MD, [place] PA-C Amila Dilusha William, MD  8/30/2023  11:45 AM        Thank you for allowing me to participate in the care of your patient.      Sincerely,     Omega Manzano MD     [facility]  cc:   Lazarus Sutton PA-C  [id number]  [address] [place] [id number]

## 2023-08-30 NOTE — PATIENT INSTRUCTIONS
[facility]  Cardiac Electrophysiology  [address]  [place] [address]   Office: [phone]  Fax: [phone]     Thank you for seeing us in clinic today - it is a pleasure to be a part of your care team.  Below is a summary of our plan from today's visit.       You have multiple episodes of supraventricular tachycardia (1564 episodes on your 5/2023 Zio monitor), including possibly a few brief episodes which developed into brief atrial fibrillation.  We reviewed physiology and management options including drug therapy, catheter ablation.    We will plan for the following:  - start metoprolol XL 25mg daily  - our office will work with you to coordinate an ablation procedure  - you should avoid lifting more than 25lbs or bending repeatedly at the hip for 7 days following the ablation     Please do not hesitate to be in touch with our office at [phone] with any questions that may arise.       Thank you for trusting us with your care,    Omega Manzano MD  Clinical Cardiac Electrophysiology  [facility]  [address]  [place] [address]         ATRIAL FIBRILLATION: Patient Information    What is atrial fibrillation?  Atrial fibrillation (AF, A-fib) is a common heart rhythm problem (arrhythmia) occurring within the upper chambers of the heart (the atria).  In normal rhythm, the upper and lower chambers of the heart are electrically driven to contract in a coordinated sequence.  In atrial fibrillation, the atria lose their ability to contract because of rapid and chaotic electrical activity.  The lower chambers of the heart (the ventricles) continue to pump blood throughout the body, though with irregular and often faster rate due to the chaotic activity within the atria.        How do I know if I have atrial fibrillation?   Some people may feel their heart beating faster, harder, or irregularly while in atrial fibrillation.   Others may be lightheaded, fatigued, feel weak or tired or become more short of breath especially with activities.  Some patients have no symptoms at all.  Atrial fibrillation may be found due to an irregular pulse or on an electrocardiogram (ECG). Atrial fibrillation can start and stop on its own, and episodes can last from seconds to several months.      How common is atrial fibrillation?   An estimated 3-6 million people in the [place] have atrial fibrillation.  Atrial fibrillation is a common heart rhythm problem for older persons, affecting as estimated 12-15% of people over the age of 65 years of age.    What causes atrial fibrillation?   Age is the most important risk factor for atrial fibrillation.  Atrial fibrillation is more common in people with other heart disease, high blood pressure, diabetes, obesity, sleep apnea and in people who regularly consume alcohol.  Surgery, lung disease, or thyroid problems can lead to atrial fibrillation.  Atrial fibrillation has multiple possible causes, and in most cases a single cause cannot be found.  Atrial fibrillation is a progressive condition, usually starting with at an early stage with short and infrequent episodes.  In later stages of disease, more frequent and longer lasting episodes of atrial fibrillation occur, ultimately culminating in episodes which do not spontaneously terminate.  Generally, more enlargement and scarring within the upper chambers of the heart is observed as atrial fibrillation progresses from early to late-stage disease.    How is atrial fibrillation diagnosed and evaluated?    Because of its start-stop nature, atrial fibrillation can be challenging to diagnose.  Atrial fibrillation is most commonly diagnosed via cardiac rhythm recordings - either an ECG or wearable cardiac rhythm monitor.  For patients with pacemakers, defibrillators or implantable loop recorders, atrial fibrillation may be recorded via these devices.  Recently,  commercially available devices (eg. Apple Watch, [employer] device, certain FitBit devices, others) can allow patients to take 30 second cardiac rhythm recordings which may document atrial fibrillation.  Once atrial fibrillation is diagnosed, additional tests include blood tests and an echocardiogram.  The echocardiogram uses ultrasound to look at your heart to assess your cardiac function and evaluate for other heart disease.  Additional evaluation may include CT or MRI studies.    Is atrial fibrillation dangerous?   Atrial fibrillation is not usually a life-threatening arrhythmia.  The most serious consequences of atrial fibrillation including stroke and worsening of overall cardiac function.  While in atrial fibrillation, the upper cardiac chambers do not contract normally, resulting in slower blood flow and increased risk of clot formation.  If this blood clot becomes detached from the heart a stroke can occur.  Unfortunately, stroke can be the first sign of atrial fibrillation for some people.  With a stroke, you may notice abnormal sensation, weakness on one side of the body or face, changes in your vision or speech.  If you have any of these signs, you should contact EMS and be evaluated in an emergency room as soon as possible.      How is atrial fibrillation treated?     Several treatment options exist for suppressing atrial fibrillation - however, it is not an easily curable arrhythmia.  The first goal in managing atrial fibrillation is to minimize stroke risk.  The second goal is to improve symptoms associated with atrial fibrillation.  Finally, in patients with reduced cardiac function, maintaining normal rhythm can help improve cardiac function.      Blood thinners are used to reduce the risk of stroke in patients with high estimated stroke risk related to atrial fibrillation.  For patients at higher risk of bleeding related to blood thinner use, implantable devices may be an option to reduce stroke risk  without the need for long term blood thinner use.      Atrial fibrillation can be managed via two strategies: rate control and rhythm control.  In a rate control strategy, continued atrial fibrillation is accepted and medications (eg. beta-blockers or calcium channel blockers) are used to control the lower chamber rate.  In a rhythm control strategy, anti-arrhythmic medications or catheter ablation are used to maintain normal cardiac rhythm and slow disease progression by suppressing atrial fibrillation.  A procedure called a cardioversion, in which an electric shock is delivered through patches placed on the chest wall while under deep sedation, can be performed to temporarily restore normal cardiac rhythm, though does not address the chance of atrial fibrillation recurrence.  Treatments are more effective for earlier rather than later stage atrial fibrillation.  Lifestyle modifications (maintaining a healthy weight, aerobic exercise, diagnosing and treating sleep apnea, and minimizing alcohol intake) are important elements of atrial fibrillation rhythm control.     What is catheter ablation for atrial fibrillation?  Cardiac catheter ablation is a commonly performed, minimally invasive procedure performed by a cardiac electrophysiologist to treat many different cardiac rhythm abnormalities.  During catheter ablation, long, thin, flexible tubes are advanced into the heart via small sheaths inserted into the femoral veins and thermal energy (either heating or cooling) is applied within the heart to disrupt abnormal electrical activity.  Atrial fibrillation ablation is performed under general anesthesia, with procedures generally taking approximately 2-3 hours.  Patients are typically observed for 3-5 hours after the ablation, and in most cases can be discharged home the same day.  Atrial fibrillation ablation is associated with better outcomes (mortality, cardiovascular hospitalizations, atrial arrhythmia  recurrences) compared to antiarrhythmic drug therapy.  However, atrial fibrillation recurrences are not uncommon, and repeat catheter ablation may be offered.  Your electrophysiology team can review atrial fibrillation ablation, anticipated success rates, risks, and recovery expectations with you.    What are anti-arrhythmic medications?  Anti-arrhythmic medications are specialized drugs which alter cardiac electrical functioning to suppress arrhythmias.  There are several anti-arrhythmic medications available, each with its own success rate and side effects.  Some anti-arrhythmic medications are less effective though safer to use, others are more effective though have serious potential toxicities.  Atrial fibrillation recurrences are common and may require dose adjustment or change in antiarrhythmic therapy.  Your electrophysiology team will carefully consider which medication would be the best and safest for your particular case.      Can I live a normal life?    The goal of atrial fibrillation management is for patients to live normal lives without being limited by symptoms related to atrial fibrillation.    Are any additional educational resources available?  There are a number of excellent atrial fibrillation education resources available to you online.  A few options you may wish to review include:  hrsonline.org/guide-atrial-fibrillation  afibmatters.org  getsmartaboutafib.com  stopaf.com    What comes next?    Consider your management options and let us know how we can help in your decision process.  Please take medications as they have been prescribed.  You should also get any tests that may have been ordered for you.      When to Call Your Doctor or seek emergency care:  Call your doctor or seek emergency care if you have any significant changes with the following:  Weakness  Dizziness  Fainting  Fatigue  Shortness of breath  Chest pain with increased activity  If you are concerned that your heart rate is  too fast or too slow  Bleeding that does not stop in 10 minutes  Coughing or throwing up blood  Bloody diarrhea or bleeding hemorrhoids  Dark-colored urine or black stool  Allergic reactions:  Rash  Itching  Swelling  Trouble breathing or swallowing      Please call the [facility] at [phone] if you have concerns about your symptoms, your medicines, or your follow-up appointments.

## 2023-08-31 ENCOUNTER — TELEPHONE (OUTPATIENT)
Dept: CARDIOLOGY | Facility: CLINIC | Age: 53
End: 2023-08-31
Payer: COMMERCIAL

## 2023-08-31 NOTE — TELEPHONE ENCOUNTER
8/31/23 Msg recd from Dr Jose Juan Manzano, Omega Mendez MD  P [facility] Heart Ep Nurse; Loyda Harden,    Can we please set Mrs. Riojas up for an EP study/ablation for SVT?  MAC, hold metoprolol XL 7 days prior.    Thank you!  Andrea      Ablation scheduled for 10/4  Chart flagged for 9/27    [id number]

## 2023-09-19 ENCOUNTER — TELEPHONE (OUTPATIENT)
Dept: CARDIOLOGY | Facility: CLINIC | Age: 53
End: 2023-09-19
Payer: COMMERCIAL

## 2023-09-19 NOTE — TELEPHONE ENCOUNTER
9/19/23 Pt called w a few questions re SVT Ablation  Asked about activity restrictions as she works at the [employer] and is on her feet walking and lifting her entire 8-12 hour shift. Pt stated Dr Manzano had informed her she would need to take 5-7 days off from work. She will have her employer fax FMLA paperwork to us to fill out. Peña RN fax # provided .  Reminded of 7 day Metoprolol hold and that she will receive a call from Dr Mele DALY the day before procedure to review all details of the day  Pt voiced understanding and agreement with plan.   Trevor 120 pm

## 2023-10-03 ENCOUNTER — TELEPHONE (OUTPATIENT)
Dept: CARDIOLOGY | Facility: CLINIC | Age: 53
End: 2023-10-03
Payer: COMMERCIAL

## 2023-10-03 DIAGNOSIS — I47.10 SVT (SUPRAVENTRICULAR TACHYCARDIA) (H): Primary | ICD-10-CM

## 2023-10-03 RX ORDER — LIDOCAINE 40 MG/G
CREAM TOPICAL
Status: CANCELLED | OUTPATIENT
Start: 2023-10-03

## 2023-10-03 RX ORDER — SODIUM CHLORIDE, SODIUM LACTATE, POTASSIUM CHLORIDE, CALCIUM CHLORIDE 600; 310; 30; 20 MG/100ML; MG/100ML; MG/100ML; MG/100ML
INJECTION, SOLUTION INTRAVENOUS CONTINUOUS
Status: CANCELLED | OUTPATIENT
Start: 2023-10-03

## 2023-10-03 NOTE — PROGRESS NOTES
Chart reviewed for upcoming procedure on 10/4/23  CSE: Yes  Pertinent allergies: No  Contrast allergy: No  Blood thinners: No  GLP-1: No  Orders in place: Yes  H&P completed: Provider to update at bedside.   Heart Clinic pre-procedure phone call: 10/3/23

## 2023-10-04 ENCOUNTER — ANESTHESIA (OUTPATIENT)
Dept: CARDIOLOGY | Facility: CLINIC | Age: 53
End: 2023-10-04
Payer: COMMERCIAL

## 2023-10-04 ENCOUNTER — ANESTHESIA EVENT (OUTPATIENT)
Dept: CARDIOLOGY | Facility: CLINIC | Age: 53
End: 2023-10-04
Payer: COMMERCIAL

## 2023-10-04 ENCOUNTER — HOSPITAL ENCOUNTER (OUTPATIENT)
Facility: CLINIC | Age: 53
Discharge: HOME | End: 2023-10-04
Admitting: INTERNAL MEDICINE
Payer: COMMERCIAL

## 2023-10-04 VITALS
OXYGEN SATURATION: 97 % | RESPIRATION RATE: 16 BRPM | SYSTOLIC BLOOD PRESSURE: 102 MMHG | BODY MASS INDEX: 31.54 KG/M2 | WEIGHT: 178 LBS | HEART RATE: 53 BPM | HEIGHT: 63 IN | DIASTOLIC BLOOD PRESSURE: 60 MMHG | TEMPERATURE: 97.4 F

## 2023-10-04 DIAGNOSIS — I47.10 SVT (SUPRAVENTRICULAR TACHYCARDIA) (H): ICD-10-CM

## 2023-10-04 LAB
ANION GAP SERPL CALCULATED.3IONS-SCNC: 11 MMOL/L (ref 7–15)
BUN SERPL-MCNC: 12.8 MG/DL (ref 6–20)
CALCIUM SERPL-MCNC: 9.4 MG/DL (ref 8.6–10)
CHLORIDE SERPL-SCNC: 104 MMOL/L (ref 98–107)
CREAT SERPL-MCNC: 0.6 MG/DL (ref 0.51–0.95)
DEPRECATED HCO3 PLAS-SCNC: 25 MMOL/L (ref 22–29)
EGFRCR SERPLBLD CKD-EPI 2021: >90 ML/MIN/1.73M2
ERYTHROCYTE [DISTWIDTH] IN BLOOD BY AUTOMATED COUNT: 14.6 % (ref 10–15)
GLUCOSE SERPL-MCNC: 95 MG/DL (ref 70–99)
HCT VFR BLD AUTO: 40 % (ref 35–47)
HGB BLD-MCNC: 12.9 G/DL (ref 11.7–15.7)
MCH RBC QN AUTO: 26.7 PG (ref 26.5–33)
MCHC RBC AUTO-ENTMCNC: 32.3 G/DL (ref 31.5–36.5)
MCV RBC AUTO: 83 FL (ref 78–100)
PLATELET # BLD AUTO: 252 10E3/UL (ref 150–450)
POTASSIUM SERPL-SCNC: 3.5 MMOL/L (ref 3.4–5.3)
RBC # BLD AUTO: 4.84 10E6/UL (ref 3.8–5.2)
SODIUM SERPL-SCNC: 140 MMOL/L (ref 135–145)
WBC # BLD AUTO: 3.8 10E3/UL (ref 4–11)

## 2023-10-04 PROCEDURE — 80048 BASIC METABOLIC PNL TOTAL CA: CPT | Performed by: INTERNAL MEDICINE

## 2023-10-04 PROCEDURE — 36591 DRAW BLOOD OFF VENOUS DEVICE: CPT

## 2023-10-04 PROCEDURE — 93653 COMPRE EP EVAL TX SVT: CPT | Performed by: INTERNAL MEDICINE

## 2023-10-04 PROCEDURE — 93623 PRGRMD STIMJ&PACG IV RX NFS: CPT | Performed by: INTERNAL MEDICINE

## 2023-10-04 PROCEDURE — C1759 CATH, INTRA ECHOCARDIOGRAPHY: HCPCS | Performed by: INTERNAL MEDICINE

## 2023-10-04 PROCEDURE — 250N000011 HC RX IP 250 OP 636: Performed by: NURSE ANESTHETIST, CERTIFIED REGISTERED

## 2023-10-04 PROCEDURE — 272N000001 HC OR GENERAL SUPPLY STERILE: Performed by: INTERNAL MEDICINE

## 2023-10-04 PROCEDURE — 250N000009 HC RX 250: Performed by: NURSE ANESTHETIST, CERTIFIED REGISTERED

## 2023-10-04 PROCEDURE — 93655 ICAR CATH ABLTJ DSCRT ARRHYT: CPT | Performed by: INTERNAL MEDICINE

## 2023-10-04 PROCEDURE — 36415 COLL VENOUS BLD VENIPUNCTURE: CPT | Performed by: INTERNAL MEDICINE

## 2023-10-04 PROCEDURE — 93010 ELECTROCARDIOGRAM REPORT: CPT | Mod: XU | Performed by: INTERNAL MEDICINE

## 2023-10-04 PROCEDURE — 370N000017 HC ANESTHESIA TECHNICAL FEE, PER MIN: Performed by: INTERNAL MEDICINE

## 2023-10-04 PROCEDURE — 93662 INTRACARDIAC ECG (ICE): CPT | Mod: 26 | Performed by: INTERNAL MEDICINE

## 2023-10-04 PROCEDURE — 999N000184 HC STATISTIC TELEMETRY

## 2023-10-04 PROCEDURE — C1733 CATH, EP, OTHR THAN COOL-TIP: HCPCS | Performed by: INTERNAL MEDICINE

## 2023-10-04 PROCEDURE — 258N000003 HC RX IP 258 OP 636: Performed by: INTERNAL MEDICINE

## 2023-10-04 PROCEDURE — 93662 INTRACARDIAC ECG (ICE): CPT | Performed by: INTERNAL MEDICINE

## 2023-10-04 PROCEDURE — 93005 ELECTROCARDIOGRAM TRACING: CPT

## 2023-10-04 PROCEDURE — 258N000003 HC RX IP 258 OP 636: Performed by: NURSE ANESTHETIST, CERTIFIED REGISTERED

## 2023-10-04 PROCEDURE — 999N000054 HC STATISTIC EKG NON-CHARGEABLE

## 2023-10-04 PROCEDURE — 85014 HEMATOCRIT: CPT | Performed by: INTERNAL MEDICINE

## 2023-10-04 PROCEDURE — 250N000011 HC RX IP 250 OP 636: Mod: JZ | Performed by: INTERNAL MEDICINE

## 2023-10-04 PROCEDURE — C1730 CATH, EP, 19 OR FEW ELECT: HCPCS | Performed by: INTERNAL MEDICINE

## 2023-10-04 PROCEDURE — 250N000011 HC RX IP 250 OP 636: Mod: JZ | Performed by: NURSE ANESTHETIST, CERTIFIED REGISTERED

## 2023-10-04 PROCEDURE — 999N000071 HC STATISTIC HEART CATH LAB OR EP LAB

## 2023-10-04 PROCEDURE — 250N000009 HC RX 250: Performed by: INTERNAL MEDICINE

## 2023-10-04 PROCEDURE — 250N000013 HC RX MED GY IP 250 OP 250 PS 637: Performed by: INTERNAL MEDICINE

## 2023-10-04 PROCEDURE — C1732 CATH, EP, DIAG/ABL, 3D/VECT: HCPCS | Performed by: INTERNAL MEDICINE

## 2023-10-04 RX ORDER — PROPOFOL 10 MG/ML
INJECTION, EMULSION INTRAVENOUS PRN
Status: DISCONTINUED | OUTPATIENT
Start: 2023-10-04 | End: 2023-10-04

## 2023-10-04 RX ORDER — LIDOCAINE 40 MG/G
CREAM TOPICAL
Status: DISCONTINUED | OUTPATIENT
Start: 2023-10-04 | End: 2023-10-04 | Stop reason: HOSPADM

## 2023-10-04 RX ORDER — FENTANYL CITRATE 50 UG/ML
INJECTION, SOLUTION INTRAMUSCULAR; INTRAVENOUS PRN
Status: DISCONTINUED | OUTPATIENT
Start: 2023-10-04 | End: 2023-10-04

## 2023-10-04 RX ORDER — SODIUM CHLORIDE, SODIUM LACTATE, POTASSIUM CHLORIDE, CALCIUM CHLORIDE 600; 310; 30; 20 MG/100ML; MG/100ML; MG/100ML; MG/100ML
INJECTION, SOLUTION INTRAVENOUS CONTINUOUS
Status: DISCONTINUED | OUTPATIENT
Start: 2023-10-04 | End: 2023-10-04 | Stop reason: HOSPADM

## 2023-10-04 RX ORDER — ONDANSETRON 2 MG/ML
INJECTION INTRAMUSCULAR; INTRAVENOUS PRN
Status: DISCONTINUED | OUTPATIENT
Start: 2023-10-04 | End: 2023-10-04

## 2023-10-04 RX ORDER — BUPIVACAINE HYDROCHLORIDE 2.5 MG/ML
INJECTION, SOLUTION EPIDURAL; INFILTRATION; INTRACAUDAL
Status: DISCONTINUED | OUTPATIENT
Start: 2023-10-04 | End: 2023-10-04 | Stop reason: HOSPADM

## 2023-10-04 RX ORDER — LIDOCAINE HYDROCHLORIDE 20 MG/ML
INJECTION, SOLUTION INFILTRATION; PERINEURAL PRN
Status: DISCONTINUED | OUTPATIENT
Start: 2023-10-04 | End: 2023-10-04

## 2023-10-04 RX ORDER — IBUPROFEN 200 MG
600 TABLET ORAL EVERY 6 HOURS PRN
Status: DISCONTINUED | OUTPATIENT
Start: 2023-10-04 | End: 2023-10-04 | Stop reason: HOSPADM

## 2023-10-04 RX ORDER — METOPROLOL SUCCINATE 25 MG/1
25 TABLET, EXTENDED RELEASE ORAL DAILY
Qty: 30 TABLET | Refills: 11 | Status: SHIPPED | OUTPATIENT
Start: 2023-10-04

## 2023-10-04 RX ORDER — DEXAMETHASONE SODIUM PHOSPHATE 4 MG/ML
INJECTION, SOLUTION INTRA-ARTICULAR; INTRALESIONAL; INTRAMUSCULAR; INTRAVENOUS; SOFT TISSUE PRN
Status: DISCONTINUED | OUTPATIENT
Start: 2023-10-04 | End: 2023-10-04

## 2023-10-04 RX ORDER — ONDANSETRON 4 MG/1
4 TABLET, ORALLY DISINTEGRATING ORAL EVERY 6 HOURS PRN
Status: DISCONTINUED | OUTPATIENT
Start: 2023-10-04 | End: 2023-10-04 | Stop reason: HOSPADM

## 2023-10-04 RX ORDER — ONDANSETRON 2 MG/ML
4 INJECTION INTRAMUSCULAR; INTRAVENOUS EVERY 6 HOURS PRN
Status: DISCONTINUED | OUTPATIENT
Start: 2023-10-04 | End: 2023-10-04 | Stop reason: HOSPADM

## 2023-10-04 RX ADMIN — FENTANYL CITRATE 25 MCG: 50 INJECTION, SOLUTION INTRAMUSCULAR; INTRAVENOUS at 11:16

## 2023-10-04 RX ADMIN — PHENYLEPHRINE HYDROCHLORIDE 100 MCG: 10 INJECTION INTRAVENOUS at 11:35

## 2023-10-04 RX ADMIN — IBUPROFEN 600 MG: 200 TABLET, FILM COATED ORAL at 14:16

## 2023-10-04 RX ADMIN — PHENYLEPHRINE HYDROCHLORIDE 50 MCG: 10 INJECTION INTRAVENOUS at 11:46

## 2023-10-04 RX ADMIN — PROPOFOL 75 MCG/KG/MIN: 10 INJECTION, EMULSION INTRAVENOUS at 11:09

## 2023-10-04 RX ADMIN — ONDANSETRON 4 MG: 2 INJECTION INTRAMUSCULAR; INTRAVENOUS at 11:15

## 2023-10-04 RX ADMIN — PHENYLEPHRINE HYDROCHLORIDE 150 MCG: 10 INJECTION INTRAVENOUS at 11:27

## 2023-10-04 RX ADMIN — FENTANYL CITRATE 25 MCG: 50 INJECTION, SOLUTION INTRAMUSCULAR; INTRAVENOUS at 12:52

## 2023-10-04 RX ADMIN — PHENYLEPHRINE HYDROCHLORIDE 100 MCG: 10 INJECTION INTRAVENOUS at 11:43

## 2023-10-04 RX ADMIN — SODIUM CHLORIDE, POTASSIUM CHLORIDE, SODIUM LACTATE AND CALCIUM CHLORIDE: 600; 310; 30; 20 INJECTION, SOLUTION INTRAVENOUS at 09:38

## 2023-10-04 RX ADMIN — FENTANYL CITRATE 25 MCG: 50 INJECTION, SOLUTION INTRAMUSCULAR; INTRAVENOUS at 12:54

## 2023-10-04 RX ADMIN — PHENYLEPHRINE HYDROCHLORIDE 150 MCG: 10 INJECTION INTRAVENOUS at 11:50

## 2023-10-04 RX ADMIN — DEXAMETHASONE SODIUM PHOSPHATE 4 MG: 4 INJECTION, SOLUTION INTRA-ARTICULAR; INTRALESIONAL; INTRAMUSCULAR; INTRAVENOUS; SOFT TISSUE at 11:15

## 2023-10-04 RX ADMIN — LIDOCAINE HYDROCHLORIDE 40 MG: 20 INJECTION, SOLUTION INFILTRATION; PERINEURAL at 11:09

## 2023-10-04 RX ADMIN — FENTANYL CITRATE 25 MCG: 50 INJECTION, SOLUTION INTRAMUSCULAR; INTRAVENOUS at 12:30

## 2023-10-04 RX ADMIN — MIDAZOLAM 2 MG: 1 INJECTION INTRAMUSCULAR; INTRAVENOUS at 10:58

## 2023-10-04 ASSESSMENT — ACTIVITIES OF DAILY LIVING (ADL)
ADLS_ACUITY_SCORE: 35

## 2023-10-04 ASSESSMENT — ENCOUNTER SYMPTOMS: DYSRHYTHMIAS: 1

## 2023-10-04 ASSESSMENT — LIFESTYLE VARIABLES: TOBACCO_USE: 0

## 2023-10-04 NOTE — Clinical Note
dry, intact, no bleeding and no hematoma. [Dyspnea on Exertion] : dyspnea on exertion [Negative] : Heme/Lymph

## 2023-10-04 NOTE — ANESTHESIA CARE TRANSFER NOTE
Patient: Hortencia Cooper    Procedure: Procedure(s):  Ablation Supraventricular Tachycardia       Diagnosis: Supraventricular tachycardia  Diagnosis Additional Information: No value filed.    Anesthesia Type:   MAC     Note:    Oropharynx: oropharynx clear of all foreign objects  Level of Consciousness: awake  Oxygen Supplementation: room air    Independent Airway: airway patency satisfactory and stable  Dentition: dentition unchanged  Vital Signs Stable: post-procedure vital signs reviewed and stable  Report to RN Given: handoff report given  Patient transferred to: PACU    Handoff Report: Identifed the Patient, Identified the Reponsible Provider, Reviewed the pertinent medical history, Discussed the surgical course, Reviewed Intra-OP anesthesia mangement and issues during anesthesia, Set expectations for post-procedure period and Allowed opportunity for questions and acknowledgement of understanding      Vitals:  Vitals Value Taken Time   BP     Temp     Pulse 53 10/04/23 1310   Resp 18 10/04/23 1310   SpO2 97 % 10/04/23 1310   Vitals shown include unvalidated device data.    Electronically Signed By: CHONG Lopez CRNA  October 4, 2023  1:12 PM

## 2023-10-04 NOTE — PROGRESS NOTES
Care Suites Discharge Nursing Note    Patient Information  Name: Hortencia Cooper  Age: 53 year old    Discharge Education:  Discharge instructions reviewed: Yes  Additional education/resources provided: NA  Patient/patient representative verbalizes understanding: Yes  Patient discharging on new medications: No  Medication education completed: N/A    Discharge Plans:   Discharge location: home  Discharge ride contacted: Yes  Approximate discharge time: 1630    Discharge Criteria:  Discharge criteria met and vital signs stable: Yes. Right groin CDI/soft, some discomfort at groin site-MD aware-ambulated/voided/ate/PIV pulled.    Patient Belongs:  Patient belongings returned to patient: Yes    Annette Peterson, RN

## 2023-10-04 NOTE — ANESTHESIA POSTPROCEDURE EVALUATION
Patient: Hortencia Cooper    Procedure: Procedure(s):  Ablation Supraventricular Tachycardia       Anesthesia Type:  MAC    Note:  Disposition: Admission   Postop Pain Control: Uneventful            Sign Out: Well controlled pain   PONV: No   Neuro/Psych: Uneventful            Sign Out: Acceptable/Baseline neuro status   Airway/Respiratory: Uneventful            Sign Out: Acceptable/Baseline resp. status   CV/Hemodynamics: Uneventful            Sign Out: Acceptable CV status; No obvious hypovolemia; No obvious fluid overload   Other NRE: NONE   DID A NON-ROUTINE EVENT OCCUR? No           Last vitals:  Vitals Value Taken Time   /66 10/04/23 1340   Temp 36.3  C (97.4  F) 10/04/23 1340   Pulse 51 10/04/23 1346   Resp 20 10/04/23 1346   SpO2 98 % 10/04/23 1346   Vitals shown include unvalidated device data.    Electronically Signed By: Rocio Vasquez MD  October 4, 2023  5:00 PM

## 2023-10-04 NOTE — DISCHARGE INSTRUCTIONS
SVT Ablation Discharge Instructions      After you go home:    Have an adult stay with you until tomorrow.  You may resume your normal diet.       For 24 hours - due to the sedation you received:  Relax and take it easy.  Do NOT make any important or legal decisions.  Do NOT drive or operate machines at home or at work.  Do NOT drink alcohol.    Care of Puncture Sites:    For the first 24 hrs - check the puncture site every 1-2 hours while awake.  For 2 days, when you cough, sneeze, laugh or move your bowels, hold your hand over the puncture site and press firmly.  Remove the dressing(s) after 24 hours. If there is minor oozing, apply a bandaid and remove it after 12 hours.  It is normal to have a small bruise, pea sized lump or soreness at the site.  You may shower tomorrow. Do NOT take a bath, or use a hot tub or pool for at least 3 days. Do NOT scrub the site. Do not use lotion or powder near the puncture site.      Activity - For 3 days:    No stooping or squatting  Do NOT do any heavy activity such as exercise, lifting, or straining.   Do NOT do housework, yard work or any activity that make you sweat  Do NOT lift more than 10 pounds      Bleeding:     If you start bleeding from the site in your groin/chest, lie down flat and press firmly on the site for 10 minutes.   Once bleeding stops, lay flat for 2 hours.  Call [facility] as soon as you can.       Call 911 right away if you have heavy bleeding or bleeding that does not stop.      Medicines:    Take your medications, including blood thinners, unless your provider tells you not to.  If you have stopped any medicines, check with your provider about when to restart them.  If you have pain or shortness of breath, you may take Advil (ibuprofen) or Tylenol (acetaminophen).      Follow Up Appointments:    Mary Jj on 11/3/23 at 1000 with and EKG  You will receive a phone call the following day/Monday from an RN - Citlaly Bhatti or Cassidy.    Call  the clinic if:    You have increased pain or a large or growing hard lump around the site.  The site is red, swollen, hot or tender.  Blood or fluid is draining from the site.  You have chills or a fever greater than 101 F (38 C).  Your leg feels numb, cool or changes color.  Increased pain in the chest and/or groin.  Increased shortness of breath  Chest pain not relieved by Tylenol or Advil  New pain in the back or belly that you cannot control with Tylenol.  Recurrent irregular or fast heart rate lasting over 2 hours.  Any questions or concerns.    Heart rhythms:  You may have some irregular heartbeats. These feel very strong. They may make you feel that the fast heart rhythm is going to start again.  Give it time. The irregular beats should occur less often.       [facility]:    [phone] ( 8am-5pm M-F)  Citlaly Bhatti    [phone] option 2 (7 days a week)  on call Cardiologist

## 2023-10-04 NOTE — ANESTHESIA PREPROCEDURE EVALUATION
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    Lumbago     Other acne       Past Surgical History:   Procedure Laterality Date     SECTION  2009    D & C  10/13/2006    Miscarriage at 10 weeks    ZZC  DELIVERY ONLY  1999    , Low Cervical      Allergies   Allergen Reactions    Percocet [Oxycodone-Acetaminophen] Rash      Social History     Tobacco Use    Smoking status: Never     Passive exposure: Never    Smokeless tobacco: Never    Tobacco comments:      outside   Substance Use Topics    Alcohol use: No      Wt Readings from Last 1 Encounters:   10/04/23 80.7 kg (178 lb)        Anesthesia Evaluation            ROS/MED HX  ENT/Pulmonary:    (-) tobacco use   Neurologic:       Cardiovascular:     (+)  - -   -  - -                        dysrhythmias (pAFib, SVT),              METS/Exercise Tolerance:     Hematologic:       Musculoskeletal:       GI/Hepatic:       Renal/Genitourinary:       Endo:     (+)               Obesity (BMI 31),       Psychiatric/Substance Use:       Infectious Disease:       Malignancy:       Other:               OUTSIDE LABS:  CBC:   Lab Results   Component Value Date    WBC 3.8 (L) 10/04/2023    WBC 5.2 2023    HGB 12.9 10/04/2023    HGB 13.3 2023    HCT 40.0 10/04/2023    HCT 40.0 2023     10/04/2023     2023     BMP:   Lab Results   Component Value Date     10/04/2023    .5 2023    POTASSIUM 3.5 10/04/2023    POTASSIUM 3.61 2023    CHLORIDE 104 10/04/2023    CHLORIDE 102.2 2023    CO2 25 10/04/2023    CO2 30.5 2023    BUN 12.8 10/04/2023    BUN 15 2023    BUN 23.1 (A) 2023    CR 0.60 10/04/2023    CR 0.65 2023    GLC  95 10/04/2023    GLC 87 01/30/2023     COAGS:   Lab Results   Component Value Date    PTT 30 04/18/2008    INR 1.03 04/18/2008    FIBR 421 (H) 04/18/2008     POC:   Lab Results   Component Value Date    HCG Negative 11/26/2007    HCGS Negative 09/06/2012     HEPATIC:   Lab Results   Component Value Date    ALBUMIN 4.4 01/30/2023    PROTTOTAL 7.1 01/30/2023    ALT 21 04/26/2007    AST 23 04/26/2007    ALKPHOS 57 01/30/2023    BILITOTAL 0.6 01/30/2023     OTHER:   Lab Results   Component Value Date    NILSA 9.4 10/04/2023    TSH 0.65 01/30/2023    T4 1.06 09/06/2012       Anesthesia Plan    ASA Status:  2    NPO Status:  NPO Appropriate    Anesthesia Type: MAC.     - Reason for MAC: straight local not clinically adequate      Maintenance: TIVA.        Consents    Anesthesia Plan(s) and associated risks, benefits, and realistic alternatives discussed. Questions answered and patient/representative(s) expressed understanding.     - Discussed: Risks, Benefits and Alternatives for the PROCEDURE were discussed     - Discussed with:  Patient            Postoperative Care    Pain management: Multi-modal analgesia, IV analgesics, Oral pain medications.   PONV prophylaxis: Ondansetron (or other 5HT-3), Dexamethasone or Solumedrol     Comments:                Rocio Vasquez MD

## 2023-10-04 NOTE — PROGRESS NOTES
Care Suites Admission Nursing Note    Patient Information  Name: Hortencia Cooper  Age: 53 year old  Reason for admission: SVT ablation  Care Suites arrival time: 0830    Visitor Information  Name:      Patient Admission/Assessment   Pre-procedure assessment complete: Yes  If abnormal assessment/labs, provider notified: N/A  NPO: Yes  Medications held per instructions/orders: Yes  Consent: deferred  If applicable, pregnancy test status: deferred  Patient oriented to room: Yes  Education/questions answered: Yes  Plan/other:     Discharge Planning  Discharge name/phone number: Anaid spouse [phone]  Overnight post sedation caregiver: Anaid  Discharge location: home    Delores Campuzano RN

## 2023-10-05 ENCOUNTER — TELEPHONE (OUTPATIENT)
Dept: CARDIOLOGY | Facility: CLINIC | Age: 53
End: 2023-10-05
Payer: COMMERCIAL

## 2023-10-05 NOTE — TELEPHONE ENCOUNTER
Spoke to pt who states that she is having no pain in her chest and able to take good deep breaths. Pt has been taking it easy all day. Pt does state that she is having pain in her groin and hit hurts to sit up and walk. Pt did have bleeding when she ambulated after bedrest and was put back on more for 15 minutes with most likely compression. Pt then stated that it had bleed a little when she returned home. Pt has asked to take a couple ibuprofen and ice the area and then in the AM reach out to Pat RN with how she is doing.  Per Mary if US of groin can be done if needed. Pt stated understanding. Donald

## 2023-10-06 LAB
ATRIAL RATE - MUSE: 50 BPM
ATRIAL RATE - MUSE: 60 BPM
DIASTOLIC BLOOD PRESSURE - MUSE: NORMAL MMHG
DIASTOLIC BLOOD PRESSURE - MUSE: NORMAL MMHG
INTERPRETATION ECG - MUSE: NORMAL
INTERPRETATION ECG - MUSE: NORMAL
P AXIS - MUSE: 54 DEGREES
P AXIS - MUSE: 67 DEGREES
PR INTERVAL - MUSE: 124 MS
PR INTERVAL - MUSE: 134 MS
QRS DURATION - MUSE: 82 MS
QRS DURATION - MUSE: 82 MS
QT - MUSE: 428 MS
QT - MUSE: 460 MS
QTC - MUSE: 419 MS
QTC - MUSE: 428 MS
R AXIS - MUSE: 78 DEGREES
R AXIS - MUSE: 87 DEGREES
SYSTOLIC BLOOD PRESSURE - MUSE: NORMAL MMHG
SYSTOLIC BLOOD PRESSURE - MUSE: NORMAL MMHG
T AXIS - MUSE: 41 DEGREES
T AXIS - MUSE: 7 DEGREES
VENTRICULAR RATE- MUSE: 50 BPM
VENTRICULAR RATE- MUSE: 60 BPM

## 2023-10-10 NOTE — TELEPHONE ENCOUNTER
Spoke to pt to let her know that this writer has her FMLA completed and will fax tomorrow.  Pt then discussed her 2 visits the the UC. One for UTI and the second for frozen L shoulder. Pt has been told to take more time off of work, thus this will come from MD that she saw for her shoulder and will be past when we had recommend going back.  No change to FMLA. Pt Asked about pt groin as she was to have called in on Friday, but because of the UTI and UC this was not done. Pt states that it is a still a little sore but getting better. Pt reminded to call if any issues going forward. Pt to see Mary on 11/3. Donald

## 2023-10-11 NOTE — TELEPHONE ENCOUNTER
[facility] [phone]. JNelsonRN

## 2023-10-26 ENCOUNTER — TRANSFERRED RECORDS (OUTPATIENT)
Dept: FAMILY MEDICINE | Facility: CLINIC | Age: 53
End: 2023-10-26

## 2023-11-02 ENCOUNTER — TRANSFERRED RECORDS (OUTPATIENT)
Dept: FAMILY MEDICINE | Facility: CLINIC | Age: 53
End: 2023-11-02

## 2023-11-06 ENCOUNTER — TELEPHONE (OUTPATIENT)
Dept: CARDIOLOGY | Facility: CLINIC | Age: 53
End: 2023-11-06
Payer: COMMERCIAL

## 2023-11-06 NOTE — TELEPHONE ENCOUNTER
11/06/23 Pt called reporting she has had 2 recent episodes of palpitations . First one occurred 2 days ago while laying down and lasted approximately 20 minutes . The second one occurred today  , lasting the same duration while she was up and about at home   She verifies she continues to take Metoprolol ER  25 mg daily.  She denies any change in health or exposure to triggers.  Pt was a no show to her follow up SVT Ablation appt on 11/3, re-scheduled to see Mary on 11/24  Routing to Dr Manzano for review  KHerroRN 226 pm

## 2023-11-07 NOTE — TELEPHONE ENCOUNTER
11/07/23 Msg recd from Dr Manzano    Noted - she had a somewhat challenging AT from the inferior pia-TV region, and challenging CTI ablation.  Seems like she had AT suppression for a few weeks, though recently a couple recurrences - it is possible she may have progressive frequency.   Let's observe for now, with plan for repeat monitoring and possibly further dose escalation of metoprolol XL if recurrent/progressive episodes.  Can consider repeat ablation pending her course.    Thanks,  Andrea     Attempted to call pt but reached . LM and requested callback. Trevor 206 pm

## 2023-11-09 ENCOUNTER — TRANSFERRED RECORDS (OUTPATIENT)
Dept: FAMILY MEDICINE | Facility: CLINIC | Age: 53
End: 2023-11-09

## 2023-11-13 NOTE — TELEPHONE ENCOUNTER
11/13/23 Spoke w pt and explained Dr Barajas thoughts and recommendations.  She will keep a diary of events and bring to OV w Mary on 11/24 to determine if repeat monitor/ med titration/repeat Ablation is indicated.  Pt voiced understanding and agreement with plan.   Trevor 1034 am

## 2023-11-24 ENCOUNTER — OFFICE VISIT (OUTPATIENT)
Dept: CARDIOLOGY | Facility: CLINIC | Age: 53
End: 2023-11-24
Payer: COMMERCIAL

## 2023-11-24 VITALS
OXYGEN SATURATION: 100 % | DIASTOLIC BLOOD PRESSURE: 68 MMHG | HEART RATE: 58 BPM | BODY MASS INDEX: 32.6 KG/M2 | SYSTOLIC BLOOD PRESSURE: 112 MMHG | WEIGHT: 184 LBS | HEIGHT: 63 IN

## 2023-11-24 DIAGNOSIS — I47.10 SVT (SUPRAVENTRICULAR TACHYCARDIA) (H): Primary | ICD-10-CM

## 2023-11-24 DIAGNOSIS — R00.2 PALPITATIONS: ICD-10-CM

## 2023-11-24 PROCEDURE — 93000 ELECTROCARDIOGRAM COMPLETE: CPT | Performed by: NURSE PRACTITIONER

## 2023-11-24 PROCEDURE — 99213 OFFICE O/P EST LOW 20 MIN: CPT | Performed by: NURSE PRACTITIONER

## 2023-11-24 RX ORDER — ESCITALOPRAM OXALATE 20 MG/1
20 TABLET ORAL DAILY
COMMUNITY

## 2023-11-24 NOTE — LETTER
November 24, 2023        Hortencia Cooper  [address] [facility] [facility] [id number]    To Whom it May Concern:    Hortencia Cooper was seen in our office on 11/24/2023 and was given the following instructions:  Patient is able to work, but stressful situations at work impact her cardiac symptoms. Please make sure patient is able to take her allotted breaks during her shifts.     Please call us with any questions or concerns [phone].      Sincerely,        Mary Jj NP, APRN CNP

## 2023-11-24 NOTE — LETTER
11/24/2023    Michelle Green PA-C  [address]  [place] [place] [address]    RE: Hortencia Copoer       Dear Colleague,     I had the pleasure of seeing Hortencia Cooper in the [facility].  Cardiology Clinic Progress Note  Hortencia Cooper MRN# [id number]   YOB: 1970 Age: 53 year old       HPI:    Hortencia Cooper is a pleasant 53 year old patient with past medical history significant for:    Paroxsymal atrial fibrillation   Symptomatic SVT    Hortencia is a delightful 53-year-old woman seen in consultation by Dr. Manzano.  Brought to the EP lab and underwent a CTI ablation, and ablation inferior pia-tricuspid annular for right atrial tachycardia.  Shortly after the procedure, she contacted our clinic with concerns of increased palpitations lasting up to 20 minutes.  She is on metoprolol ER 25 mg daily.  She was noted to have a challenging atrial tachycardia from the inferior pia-TV region and a challenging CTI ablation.  Dr. Manzano recommended observation with repeat monitoring and possible further increase of beta-blocker therapy or repeat EP study and ablation.  She is here today for follow-up.    EKG today shows sinus rhythm. Right femoral access site well healed.  Patient states her palpitations are worse when working night shift at the [employer].  Often does not get her breaks.  Over the past several weeks she states that the duration of the palpitations has been less.  Currently denies chest pain, orthopnea, PND, syncope.  Was getting lower extremity leg edema from the right femoral access site.  I did assess her groin and it is completely healed without hematoma or bruit.  Nontender to touch.      Diagnotic studies:  ZioPatch SVT 1564 episodes, underlying rhythm sinus   Echo  5/2023 EF 55-60%           Plan:   1. atrial tachyarrhythmias and symptomatic palpitations  Complex atrial tachycardia ablation as outlined above.  Continue to monitor for now.  Appears that  the palpitations have improved.  She will contact us if her palpitations get worse.  If they get better, she will decrease the metoprolol to 1/2 tablet.    She does not want to be on medication long-term.  This is the main reason she decided to proceed with EP study and ablation.  I reassured the patient that if the palpitations persist then I would put her on a monitor and have her consider repeat EPS/ablation.    A work letter was given to the patient to help with reinforcement of a less stressful work environment as well as taking breaks that are mandated by law.    Thank you for including us in her care.  She will contact us with questions or concerns.    Mary Jj, NP, APRN CNP      Today's clinic visit entailed:  Review of the result(s) of each unique test - EKG  25 minutes spent by me on the date of the encounter doing chart review, interpretation of tests, patient visit, and documentation   Provider  Link to [facility] Help Grid     The level of medical decision making during this visit was of moderate complexity.      No orders of the defined types were placed in this encounter.    No orders of the defined types were placed in this encounter.    There are no discontinued medications.      No diagnosis found.    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    Diabetes Father          in his 70's    Diabetes Sister     Diabetes Brother     Diabetes Type 2  Brother     Diabetes Brother     Colon Cancer Paternal Grandfather     Breast Cancer No family hx of        SOCIAL HISTORY:  Social History     Socioeconomic History    Marital status:      Spouse name: Anaid    Number of children: 2   Occupational History    Occupation:      Employer: [employer]   Tobacco Use    Smoking status: Never     Passive exposure: Never    Smokeless tobacco: Never    Tobacco comments:      outside   Substance and Sexual Activity    Alcohol use: No    Drug use: No    Sexual activity: Yes     Partners: Male     Birth control/protection: None     Comment:    Other Topics Concern    Blood Transfusions No    Seat Belt Yes    Self-Exams No   Social History Narrative    Living arrangements - the patient lives with their family.        Review of Systems:  Skin:        Eyes:       ENT:       Respiratory:       Cardiovascular:       Gastroenterology:      Genitourinary:       Musculoskeletal:       Neurologic:       Psychiatric:       Heme/Lymph/Imm:       Endocrine:         Physical Exam:    Vitals: There were no vitals taken for this visit.  Constitutional: Well nourished and in no apparent distress.  Eyes: Pupils equal, round. Sclerae anicteric.   HEENT: Normocephalic, atraumatic.   Neck: Supple. No JVD   Respiratory: Breathing non-labored. Lungs clear to auscultation bilaterally. No crackles, wheezes, rhonchi, or rales.  Cardiovascular:  Regular rate and rhythm, normal S1 and  "S2. No murmur, rub, or gallop.  Skin: Warm, dry. No rashes, cyanosis, or xanthelasma.  Extremities: No edema. Right femoral access site without hematoma or bruit  Neurologic: No gross motor deficits. Alert, awake, and oriented to person, place and time.  Psychiatric: Affect appropriate.        Recent Lab Results:  LIPID RESULTS:  Lab Results   Component Value Date    CHOL 157 04/26/2007    HDL 67 04/26/2007    LDL 79 04/26/2007    TRIG 53 04/26/2007    CHOLHDLRATIO 2.3 04/26/2007       LIVER ENZYME RESULTS:  Lab Results   Component Value Date    AST 23 04/26/2007    ALT 21 04/26/2007       CBC RESULTS:  Lab Results   Component Value Date    WBC 3.8 (L) 10/04/2023    WBC 5.2 01/30/2023    RBC 4.84 10/04/2023    RBC 4.81 01/30/2023    HGB 12.9 10/04/2023    HGB 13.3 01/30/2023    HCT 40.0 10/04/2023    HCT 40.0 01/30/2023    MCV 83 10/04/2023    MCV 83.2 01/30/2023    MCH 26.7 10/04/2023    MCH 27.7 01/30/2023    MCHC 32.3 10/04/2023    MCHC 33.3 01/30/2023    RDW 14.6 10/04/2023    RDW 13.9 07/14/2009     10/04/2023     01/30/2023     07/14/2009       BMP RESULTS:  Lab Results   Component Value Date     10/04/2023    .5 01/30/2023    POTASSIUM 3.5 10/04/2023    POTASSIUM 3.61 01/30/2023    CHLORIDE 104 10/04/2023    CHLORIDE 102.2 01/30/2023    CO2 25 10/04/2023    CO2 30.5 01/30/2023    ANIONGAP 11 10/04/2023    ANIONGAP 6 02/23/2008    GLC 95 10/04/2023    GLC 87 01/30/2023    BUN 12.8 10/04/2023    BUN 15 01/30/2023    BUN 23.1 (A) 01/30/2023    CR 0.60 10/04/2023    CR 0.65 01/30/2023    GFRESTIMATED >90 10/04/2023    GFRESTIMATED >90 02/23/2008    GFRESTBLACK >90 02/23/2008    NILSA 9.4 10/04/2023    NILSA 9.5 01/30/2023        A1C RESULTS:  No results found for: \"A1C\"    INR RESULTS:  Lab Results   Component Value Date    INR 1.03 04/18/2008    INR 1.03 04/18/2008           CC  Omega Manzano MD  [address]  [place] [address]      Thank you for allowing me " to participate in the care of your patient.      Sincerely,     Mary Jj NP, APRN CNP     [facility]

## 2023-11-24 NOTE — PATIENT INSTRUCTIONS
Call my nurse with any questions or concerns:  [phone]  *If you have concerns after hours, please call [phone], option 2 to speak with on call Cardiologist.    Keep things as is. IF palpitations get better then try decreasing metoprolol to 1/2 tablet  IF worse please call me

## 2023-11-24 NOTE — PROGRESS NOTES
Cardiology Clinic Progress Note  Hortencia Cooper MRN# [id number]   YOB: 1970 Age: 53 year old       HPI:    Hortencia Cooper is a pleasant 53 year old patient with past medical history significant for:    Paroxsymal atrial fibrillation   Symptomatic SVT    Hortencia is a delightful 53-year-old woman seen in consultation by Dr. Manzano.  Brought to the EP lab and underwent a CTI ablation, and ablation inferior pia-tricuspid annular for right atrial tachycardia.  Shortly after the procedure, she contacted our clinic with concerns of increased palpitations lasting up to 20 minutes.  She is on metoprolol ER 25 mg daily.  She was noted to have a challenging atrial tachycardia from the inferior pia-TV region and a challenging CTI ablation.  Dr. Manzano recommended observation with repeat monitoring and possible further increase of beta-blocker therapy or repeat EP study and ablation.  She is here today for follow-up.    EKG today shows sinus rhythm. Right femoral access site well healed.  Patient states her palpitations are worse when working night shift at the [employer].  Often does not get her breaks.  Over the past several weeks she states that the duration of the palpitations has been less.  Currently denies chest pain, orthopnea, PND, syncope.  Was getting lower extremity leg edema from the right femoral access site.  I did assess her groin and it is completely healed without hematoma or bruit.  Nontender to touch.      Diagnotic studies:  ZioPatch SVT 1564 episodes, underlying rhythm sinus   Echo  5/2023 EF 55-60%           Plan:   1. atrial tachyarrhythmias and symptomatic palpitations  Complex atrial tachycardia ablation as outlined above.  Continue to monitor for now.  Appears that the palpitations have improved.  She will contact us if her palpitations get worse.  If they get better, she will decrease the metoprolol to 1/2 tablet.    She does not want to be on medication long-term.  This is the  main reason she decided to proceed with EP study and ablation.  I reassured the patient that if the palpitations persist then I would put her on a monitor and have her consider repeat EPS/ablation.    A work letter was given to the patient to help with reinforcement of a less stressful work environment as well as taking breaks that are mandated by law.    Thank you for including us in her care.  She will contact us with questions or concerns.    Mary Jj, NP, APRN CNP      Today's clinic visit entailed:  Review of the result(s) of each unique test - EKG  25 minutes spent by me on the date of the encounter doing chart review, interpretation of tests, patient visit, and documentation   Provider  Link to [facility] Help Grid     The level of medical decision making during this visit was of moderate complexity.      No orders of the defined types were placed in this encounter.    No orders of the defined types were placed in this encounter.    There are no discontinued medications.      No diagnosis found.    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    Diabetes Father          in his 70's    Diabetes Sister     Diabetes Brother     Diabetes Type 2  Brother     Diabetes Brother     Colon Cancer Paternal Grandfather     Breast Cancer No family hx of        SOCIAL HISTORY:  Social History     Socioeconomic History    Marital status:      Spouse name: Anaid    Number of children: 2   Occupational History    Occupation:      Employer: [employer]   Tobacco Use    Smoking status: Never     Passive exposure: Never    Smokeless tobacco: Never    Tobacco comments:      outside   Substance and Sexual Activity    Alcohol use: No    Drug use: No    Sexual activity: Yes     Partners: Male     Birth control/protection: None     Comment:    Other Topics Concern    Blood Transfusions No    Seat Belt Yes    Self-Exams No   Social History Narrative    Living arrangements - the patient lives with their family.        Review of Systems:  Skin:        Eyes:       ENT:       Respiratory:       Cardiovascular:       Gastroenterology:      Genitourinary:       Musculoskeletal:       Neurologic:       Psychiatric:       Heme/Lymph/Imm:       Endocrine:         Physical Exam:    Vitals: There were no vitals taken for this visit.  Constitutional: Well nourished and in no apparent distress.  Eyes: Pupils equal, round. Sclerae anicteric.   HEENT: Normocephalic, atraumatic.   Neck: Supple. No JVD   Respiratory: Breathing non-labored. Lungs clear to auscultation bilaterally. No crackles, wheezes, rhonchi, or rales.  Cardiovascular:  Regular rate and rhythm, normal S1 and S2. No murmur, rub, or gallop.  Skin: Warm, dry. No rashes, cyanosis, or xanthelasma.  Extremities: No edema. Right femoral access site without hematoma or bruit  Neurologic: No gross motor deficits. Alert,  "awake, and oriented to person, place and time.  Psychiatric: Affect appropriate.        Recent Lab Results:  LIPID RESULTS:  Lab Results   Component Value Date    CHOL 157 04/26/2007    HDL 67 04/26/2007    LDL 79 04/26/2007    TRIG 53 04/26/2007    CHOLHDLRATIO 2.3 04/26/2007       LIVER ENZYME RESULTS:  Lab Results   Component Value Date    AST 23 04/26/2007    ALT 21 04/26/2007       CBC RESULTS:  Lab Results   Component Value Date    WBC 3.8 (L) 10/04/2023    WBC 5.2 01/30/2023    RBC 4.84 10/04/2023    RBC 4.81 01/30/2023    HGB 12.9 10/04/2023    HGB 13.3 01/30/2023    HCT 40.0 10/04/2023    HCT 40.0 01/30/2023    MCV 83 10/04/2023    MCV 83.2 01/30/2023    MCH 26.7 10/04/2023    MCH 27.7 01/30/2023    MCHC 32.3 10/04/2023    MCHC 33.3 01/30/2023    RDW 14.6 10/04/2023    RDW 13.9 07/14/2009     10/04/2023     01/30/2023     07/14/2009       BMP RESULTS:  Lab Results   Component Value Date     10/04/2023    .5 01/30/2023    POTASSIUM 3.5 10/04/2023    POTASSIUM 3.61 01/30/2023    CHLORIDE 104 10/04/2023    CHLORIDE 102.2 01/30/2023    CO2 25 10/04/2023    CO2 30.5 01/30/2023    ANIONGAP 11 10/04/2023    ANIONGAP 6 02/23/2008    GLC 95 10/04/2023    GLC 87 01/30/2023    BUN 12.8 10/04/2023    BUN 15 01/30/2023    BUN 23.1 (A) 01/30/2023    CR 0.60 10/04/2023    CR 0.65 01/30/2023    GFRESTIMATED >90 10/04/2023    GFRESTIMATED >90 02/23/2008    GFRESTBLACK >90 02/23/2008    NILSA 9.4 10/04/2023    NILSA 9.5 01/30/2023        A1C RESULTS:  No results found for: \"A1C\"    INR RESULTS:  Lab Results   Component Value Date    INR 1.03 04/18/2008    INR 1.03 04/18/2008           CC  Omega Manzano MD  [address]  [place] [address]                "

## 2024-06-17 PROBLEM — Z76.89 HEALTH CARE HOME: Status: RESOLVED | Noted: 2021-01-28 | Resolved: 2024-06-17

## (undated) DEVICE — CATHETER OCTARAY LONG SPLINE CURVE F 3-3-3-3-3 D160906

## (undated) DEVICE — INTRO SHEATH 7FRX10CM PINNACLE RSS702

## (undated) DEVICE — CATH SOUNDSTAR 8FRX90CM 10439011

## (undated) DEVICE — PACK EP SRG PROC LF DISP SAN32EPFSR

## (undated) DEVICE — CATH EP DECAPOLAR CS 7FR BI-DIRECTL FJ

## (undated) DEVICE — PATCH CARTO 3 EXTERNAL REFERENCE 3D MAPPING CREFP6

## (undated) DEVICE — CATH THERMOCOOL SMARTTOUCH SF FJ CURVE

## (undated) DEVICE — DEFIB PRO-PADZ LVP LQD GEL ADULT 8900-2105-01

## (undated) DEVICE — INTRO SHEATH 8FRX10CM PINNACLE RSS802

## (undated) DEVICE — INTRO SHEATH 9FRX10CM PINNACLE RSS902

## (undated) DEVICE — CATH EP CATH EP REPRO DAIG RSPN FX CRV DX EP C

## (undated) RX ORDER — HEPARIN SODIUM 200 [USP'U]/100ML
INJECTION, SOLUTION INTRAVENOUS
Status: DISPENSED
Start: 2023-10-04

## (undated) RX ORDER — LIDOCAINE HYDROCHLORIDE 10 MG/ML
INJECTION, SOLUTION EPIDURAL; INFILTRATION; INTRACAUDAL; PERINEURAL
Status: DISPENSED
Start: 2023-10-04

## (undated) RX ORDER — IBUPROFEN 200 MG
TABLET ORAL
Status: DISPENSED
Start: 2023-10-04

## (undated) RX ORDER — HEPARIN SODIUM 1000 [USP'U]/ML
INJECTION, SOLUTION INTRAVENOUS; SUBCUTANEOUS
Status: DISPENSED
Start: 2023-10-04

## (undated) RX ORDER — BUPIVACAINE HYDROCHLORIDE 2.5 MG/ML
INJECTION, SOLUTION EPIDURAL; INFILTRATION; INTRACAUDAL
Status: DISPENSED
Start: 2023-10-04

## (undated) RX ORDER — FENTANYL CITRATE 50 UG/ML
INJECTION, SOLUTION INTRAMUSCULAR; INTRAVENOUS
Status: DISPENSED
Start: 2023-10-04